# Patient Record
Sex: FEMALE | Race: WHITE | NOT HISPANIC OR LATINO | Employment: OTHER | URBAN - NONMETROPOLITAN AREA
[De-identification: names, ages, dates, MRNs, and addresses within clinical notes are randomized per-mention and may not be internally consistent; named-entity substitution may affect disease eponyms.]

---

## 2017-01-16 ENCOUNTER — CLINICAL SUPPORT (OUTPATIENT)
Dept: OBSTETRICS AND GYNECOLOGY | Facility: CLINIC | Age: 47
End: 2017-01-16

## 2017-01-16 VITALS
HEIGHT: 65 IN | BODY MASS INDEX: 24.49 KG/M2 | WEIGHT: 147 LBS | SYSTOLIC BLOOD PRESSURE: 126 MMHG | DIASTOLIC BLOOD PRESSURE: 70 MMHG

## 2017-01-16 DIAGNOSIS — Z79.890 POSTMENOPAUSAL HRT (HORMONE REPLACEMENT THERAPY): Primary | ICD-10-CM

## 2017-01-16 PROCEDURE — 96372 THER/PROPH/DIAG INJ SC/IM: CPT | Performed by: OBSTETRICS & GYNECOLOGY

## 2017-01-16 RX ORDER — GUANFACINE 1 MG/1
1 TABLET ORAL NIGHTLY
COMMUNITY
End: 2017-02-01

## 2017-01-16 RX ORDER — UREA 10 %
5 LOTION (ML) TOPICAL NIGHTLY
COMMUNITY

## 2017-01-16 RX ORDER — TINIDAZOLE 500 MG/1
TABLET ORAL
COMMUNITY
End: 2017-02-01

## 2017-01-16 RX ORDER — VENLAFAXINE HYDROCHLORIDE 150 MG/1
150 CAPSULE, EXTENDED RELEASE ORAL DAILY
COMMUNITY
End: 2017-05-10 | Stop reason: SDUPTHER

## 2017-01-16 RX ORDER — TIZANIDINE 4 MG/1
4 TABLET ORAL NIGHTLY PRN
COMMUNITY
End: 2017-05-10

## 2017-01-16 RX ORDER — TOPIRAMATE 25 MG/1
25 CAPSULE, COATED PELLETS ORAL 2 TIMES DAILY
COMMUNITY
End: 2017-06-12

## 2017-01-16 RX ORDER — DEXTROAMPHETAMINE SACCHARATE, AMPHETAMINE ASPARTATE, DEXTROAMPHETAMINE SULFATE AND AMPHETAMINE SULFATE 2.5; 2.5; 2.5; 2.5 MG/1; MG/1; MG/1; MG/1
10 TABLET ORAL DAILY
COMMUNITY
End: 2017-02-01

## 2017-01-16 RX ORDER — OXYCODONE AND ACETAMINOPHEN 10; 325 MG/1; MG/1
1 TABLET ORAL EVERY 6 HOURS PRN
COMMUNITY
End: 2017-08-21 | Stop reason: SDUPTHER

## 2017-01-16 RX ORDER — OMEPRAZOLE 20 MG/1
20 CAPSULE, DELAYED RELEASE ORAL DAILY
COMMUNITY
End: 2017-05-10 | Stop reason: SDUPTHER

## 2017-01-16 RX ORDER — FENTANYL 50 UG/H
1 PATCH TRANSDERMAL
COMMUNITY
End: 2017-10-31 | Stop reason: SDUPTHER

## 2017-02-01 ENCOUNTER — OFFICE VISIT (OUTPATIENT)
Dept: INTERNAL MEDICINE | Facility: CLINIC | Age: 47
End: 2017-02-01

## 2017-02-01 VITALS
OXYGEN SATURATION: 95 % | BODY MASS INDEX: 24.68 KG/M2 | HEIGHT: 65 IN | TEMPERATURE: 99 F | SYSTOLIC BLOOD PRESSURE: 122 MMHG | HEART RATE: 72 BPM | DIASTOLIC BLOOD PRESSURE: 80 MMHG | WEIGHT: 148.13 LBS

## 2017-02-01 DIAGNOSIS — K21.9 GASTROESOPHAGEAL REFLUX DISEASE WITHOUT ESOPHAGITIS: ICD-10-CM

## 2017-02-01 DIAGNOSIS — F32.A ANXIETY AND DEPRESSION: Primary | ICD-10-CM

## 2017-02-01 DIAGNOSIS — M54.50 CHRONIC BILATERAL LOW BACK PAIN WITHOUT SCIATICA: ICD-10-CM

## 2017-02-01 DIAGNOSIS — G89.29 CHRONIC BILATERAL LOW BACK PAIN WITHOUT SCIATICA: ICD-10-CM

## 2017-02-01 DIAGNOSIS — F41.9 ANXIETY AND DEPRESSION: Primary | ICD-10-CM

## 2017-02-01 DIAGNOSIS — F90.2 ATTENTION DEFICIT HYPERACTIVITY DISORDER (ADHD), COMBINED TYPE: ICD-10-CM

## 2017-02-01 LAB
ALBUMIN SERPL-MCNC: 4.6 G/DL (ref 3.2–4.8)
ALBUMIN/GLOB SERPL: 1.7 G/DL (ref 1.5–2.5)
ALP SERPL-CCNC: 78 U/L (ref 25–100)
ALT SERPL W P-5'-P-CCNC: 29 U/L (ref 7–40)
ANION GAP SERPL CALCULATED.3IONS-SCNC: 8 MMOL/L (ref 3–11)
AST SERPL-CCNC: 34 U/L (ref 0–33)
BASOPHILS # BLD AUTO: 0.01 10*3/MM3 (ref 0–0.2)
BASOPHILS NFR BLD AUTO: 0.3 % (ref 0–1)
BILIRUB SERPL-MCNC: 0.4 MG/DL (ref 0.3–1.2)
BUN BLD-MCNC: 20 MG/DL (ref 9–23)
BUN/CREAT SERPL: 33.3 (ref 7–25)
CALCIUM SPEC-SCNC: 9.8 MG/DL (ref 8.7–10.4)
CHLORIDE SERPL-SCNC: 100 MMOL/L (ref 99–109)
CO2 SERPL-SCNC: 29 MMOL/L (ref 20–31)
CREAT BLD-MCNC: 0.6 MG/DL (ref 0.6–1.3)
DEPRECATED RDW RBC AUTO: 40.3 FL (ref 37–54)
EOSINOPHIL # BLD AUTO: 0.06 10*3/MM3 (ref 0.1–0.3)
EOSINOPHIL NFR BLD AUTO: 1.7 % (ref 0–3)
ERYTHROCYTE [DISTWIDTH] IN BLOOD BY AUTOMATED COUNT: 12.9 % (ref 11.3–14.5)
GFR SERPL CREATININE-BSD FRML MDRD: 107 ML/MIN/1.73
GLOBULIN UR ELPH-MCNC: 2.7 GM/DL
GLUCOSE BLD-MCNC: 80 MG/DL (ref 70–100)
HCT VFR BLD AUTO: 37.5 % (ref 34.5–44)
HGB BLD-MCNC: 12.7 G/DL (ref 11.5–15.5)
IMM GRANULOCYTES # BLD: 0 10*3/MM3 (ref 0–0.03)
IMM GRANULOCYTES NFR BLD: 0 % (ref 0–0.6)
LYMPHOCYTES # BLD AUTO: 1.64 10*3/MM3 (ref 0.6–4.8)
LYMPHOCYTES NFR BLD AUTO: 46.1 % (ref 24–44)
MCH RBC QN AUTO: 29.1 PG (ref 27–31)
MCHC RBC AUTO-ENTMCNC: 33.9 G/DL (ref 32–36)
MCV RBC AUTO: 85.8 FL (ref 80–99)
MONOCYTES # BLD AUTO: 0.39 10*3/MM3 (ref 0–1)
MONOCYTES NFR BLD AUTO: 11 % (ref 0–12)
NEUTROPHILS # BLD AUTO: 1.46 10*3/MM3 (ref 1.5–8.3)
NEUTROPHILS NFR BLD AUTO: 40.9 % (ref 41–71)
PLATELET # BLD AUTO: 193 10*3/MM3 (ref 150–450)
PMV BLD AUTO: 10.2 FL (ref 6–12)
POTASSIUM BLD-SCNC: 3.9 MMOL/L (ref 3.5–5.5)
PROT SERPL-MCNC: 7.3 G/DL (ref 5.7–8.2)
RBC # BLD AUTO: 4.37 10*6/MM3 (ref 3.89–5.14)
SODIUM BLD-SCNC: 137 MMOL/L (ref 132–146)
WBC NRBC COR # BLD: 3.56 10*3/MM3 (ref 3.5–10.8)

## 2017-02-01 PROCEDURE — 36415 COLL VENOUS BLD VENIPUNCTURE: CPT | Performed by: INTERNAL MEDICINE

## 2017-02-01 PROCEDURE — 99203 OFFICE O/P NEW LOW 30 MIN: CPT | Performed by: INTERNAL MEDICINE

## 2017-02-01 PROCEDURE — 85025 COMPLETE CBC W/AUTO DIFF WBC: CPT | Performed by: INTERNAL MEDICINE

## 2017-02-01 PROCEDURE — 80053 COMPREHEN METABOLIC PANEL: CPT | Performed by: INTERNAL MEDICINE

## 2017-02-01 RX ORDER — POTASSIUM CHLORIDE 750 MG/1
10 TABLET, FILM COATED, EXTENDED RELEASE ORAL 2 TIMES DAILY
COMMUNITY
End: 2017-05-10 | Stop reason: SDUPTHER

## 2017-02-01 RX ORDER — GABAPENTIN 800 MG/1
800 TABLET ORAL 3 TIMES DAILY
COMMUNITY
End: 2017-05-10

## 2017-02-01 RX ORDER — LAMOTRIGINE 100 MG/1
100 TABLET ORAL DAILY
COMMUNITY
End: 2017-05-10 | Stop reason: SDUPTHER

## 2017-02-01 RX ORDER — GUANFACINE 2 MG/1
TABLET, EXTENDED RELEASE ORAL
Refills: 4 | COMMUNITY
Start: 2017-01-03 | End: 2017-05-10

## 2017-02-01 RX ORDER — DEXTROAMPHETAMINE SACCHARATE, AMPHETAMINE ASPARTATE MONOHYDRATE, DEXTROAMPHETAMINE SULFATE AND AMPHETAMINE SULFATE 6.25; 6.25; 6.25; 6.25 MG/1; MG/1; MG/1; MG/1
25 CAPSULE, EXTENDED RELEASE ORAL EVERY MORNING
Qty: 30 CAPSULE | Refills: 0 | Status: SHIPPED | OUTPATIENT
Start: 2017-02-01 | End: 2017-03-10 | Stop reason: SDUPTHER

## 2017-02-01 NOTE — PROGRESS NOTES
Chief Complaint   Patient presents with   • Establish Care     with Dr. Vermeesch.      Subjective   Jazmyn Sinclair is a 46 y.o. female.     HPI Comments: Patient is here to be established.  Past medical history of anxiety and depression, gastroesophageal reflux disease, migraine headaches, anemia, chronic back pain status post multiple surgeries, ADHD.  Past surgical history of appendectomy, tubal ligation, cryoablation, carpal tunnel release, hysterectomy and multiple back surgeries.  Social history, family history, medications and allergies have been reviewed.  HCM:  Mammo 2015, pap Nov 2016.  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  Last surgery was 2008 for spinal cord implant stimulator.   She does not have any significant falls.   She sees Dr Moreland for her pain medications.  She has had an EGD in 2001 that showed HH and GERD, omeprazole works fairly well.   She had colonoscopy in early 2000s, it was normal.   Her father had colon cancer.  No black stools or blood in stool.  She has chronic constipation.   She states she gets a lot of fruits and vegetables and drinks a lot of water.   Her HAs are well controlled.  She states she has had ADHD for many yrs.  She went for formal testing in [place] for this diagnosis.   Pt states she has to write things down on boards in rooms to keep herself on task.  She was started on intuniv in the past one yr by last PCP and states it is helpful.  She is taking adderall 25 mg every AM.   Her depression has worsened over the last several months.  Her sister committed suicide and she is grieving at this time.  She is having difficulty accepting this and does agree to counseling.  Has not had any blood work done for a long time.                  The following portions of the patient's history were reviewed and updated as appropriate: allergies, current medications, past family history, past  "medical history, past social history, past surgical history and problem list.    Review of Systems   Respiratory: Negative for shortness of breath.    Cardiovascular: Negative for chest pain and palpitations.   Gastrointestinal: Positive for constipation.   Musculoskeletal: Positive for back pain and myalgias.        Muscle spasms in neck   Neurological: Negative for headaches.   Psychiatric/Behavioral: Positive for decreased concentration and dysphoric mood. Negative for suicidal ideas. The patient is nervous/anxious.    All other systems reviewed and are negative.      Objective   Visit Vitals   • /80   • Pulse 72   • Temp 99 °F (37.2 °C)   • Ht 65\" (165.1 cm)   • Wt 148 lb 2 oz (67.2 kg)   • LMP  (LMP Unknown)   • SpO2 95%   • BMI 24.65 kg/m2     Body mass index is 24.65 kg/(m^2).  Physical Exam   Constitutional: She is oriented to person, place, and time. She appears well-developed and well-nourished.   HENT:   Head: Normocephalic and atraumatic.   Right Ear: External ear normal.   Left Ear: External ear normal.   Mouth/Throat: Oropharynx is clear and moist.   Eyes: Conjunctivae and EOM are normal. Pupils are equal, round, and reactive to light.   Neck: Normal range of motion. Neck supple. No thyromegaly present.   Cardiovascular: Normal rate, regular rhythm, normal heart sounds and intact distal pulses.    No murmur heard.  Pulmonary/Chest: Effort normal and breath sounds normal. No respiratory distress.   Abdominal: Soft. Bowel sounds are normal.   Musculoskeletal: She exhibits no edema.   Lymphadenopathy:     She has no cervical adenopathy.   Neurological: She is alert and oriented to person, place, and time. No cranial nerve deficit.   Psychiatric: Judgment normal.   Flat affect and sad mood   Nursing note and vitals reviewed.      Assessment/Plan   Jazmyn Sinclair is here today and the following problems have been addressed:      Jazmyn was seen today for establish care.    Diagnoses and all orders for " this visit:    Anxiety and depression  -     CBC & Differential; Future  -     Comprehensive Metabolic Panel; Future  -     Ambulatory Referral to Psychology    Attention deficit hyperactivity disorder (ADHD), combined type    Gastroesophageal reflux disease without esophagitis  -     CBC & Differential; Future  -     Comprehensive Metabolic Panel; Future    Chronic bilateral low back pain without sciatica  -     CBC & Differential; Future  -     Comprehensive Metabolic Panel; Future    Other orders  -     amphetamine-dextroamphetamine XR (ADDERALL XR) 25 MG 24 hr capsule; Take 1 capsule by mouth Every Morning.      Labs as noted  We discussed grief counseling and counseling for attention deficit disorder, referred to counselor  No med changes for now  Continue to see pain management  Patient signed a controlled medication contract, Adderall refill was provided.  She is agreeable to annual drug testing    RTC 3 mo

## 2017-02-06 ENCOUNTER — RESULTS ENCOUNTER (OUTPATIENT)
Dept: OBSTETRICS AND GYNECOLOGY | Facility: CLINIC | Age: 47
End: 2017-02-06

## 2017-02-06 ENCOUNTER — OFFICE VISIT (OUTPATIENT)
Dept: OBSTETRICS AND GYNECOLOGY | Facility: CLINIC | Age: 47
End: 2017-02-06

## 2017-02-06 VITALS
HEIGHT: 65 IN | WEIGHT: 147 LBS | DIASTOLIC BLOOD PRESSURE: 78 MMHG | SYSTOLIC BLOOD PRESSURE: 124 MMHG | BODY MASS INDEX: 24.49 KG/M2

## 2017-02-06 DIAGNOSIS — Z11.3 SCREENING FOR STD (SEXUALLY TRANSMITTED DISEASE): ICD-10-CM

## 2017-02-06 DIAGNOSIS — A59.01 TRICHOMONAL VULVOVAGINITIS: ICD-10-CM

## 2017-02-06 DIAGNOSIS — Z11.3 SCREENING FOR STD (SEXUALLY TRANSMITTED DISEASE): Primary | ICD-10-CM

## 2017-02-06 PROCEDURE — 99213 OFFICE O/P EST LOW 20 MIN: CPT | Performed by: PHYSICIAN ASSISTANT

## 2017-02-06 RX ORDER — TOPIRAMATE 25 MG/1
TABLET ORAL
Refills: 1 | COMMUNITY
Start: 2017-02-01 | End: 2017-05-10

## 2017-02-06 RX ORDER — GABAPENTIN 600 MG/1
600 TABLET ORAL 3 TIMES DAILY
Refills: 1 | COMMUNITY
Start: 2017-02-01 | End: 2017-08-21 | Stop reason: SDUPTHER

## 2017-02-06 NOTE — PROGRESS NOTES
"Subjective   Chief Complaint   Patient presents with   • Follow-up     STD recheck     Visit Vitals   • /78   • Ht 64.5\" (163.8 cm)   • Wt 147 lb (66.7 kg)   • LMP  (LMP Unknown)   • BMI 24.84 kg/m2      Jazmyn Sinclair is a 46 y.o. year old  presenting to be seen for STD screening  She was seen for annual 2016 and noted to have trichomonas on pap smear  She has completed treatment with Flagyl and desires to be rescreened to make sure trichomonas resolved    Past Medical History   Diagnosis Date   • Acid reflux    • Anemia    • Anxiety    • Arthritis    • Depression    • Fractures    • GERD (gastroesophageal reflux disease)    • Kidney infection    • Migraine    • Ovarian cyst    • Pap smear for cervical cancer screening      2016        Current Outpatient Prescriptions:   •  amphetamine-dextroamphetamine XR (ADDERALL XR) 25 MG 24 hr capsule, Take 1 capsule by mouth Every Morning., Disp: 30 capsule, Rfl: 0  •  estradiol cypionate (DEPO-ESTRADIOL) 5 MG/ML injection, Inject  into the shoulder, thigh, or buttocks Every 28 (Twenty-Eight) Days., Disp: , Rfl:   •  fentaNYL (DURAGESIC) 50 MCG/HR patch, Place 1 patch on the skin Every 72 (Seventy-Two) Hours., Disp: , Rfl:   •  gabapentin (NEURONTIN) 600 MG tablet, , Disp: , Rfl: 1  •  GuanFACINE HCl ER 2 MG tablet sustained-release 24 hour, , Disp: , Rfl: 4  •  lamoTRIgine (LaMICtal) 100 MG tablet, Take 100 mg by mouth Daily., Disp: , Rfl:   •  melatonin 1 MG tablet, Take  by mouth., Disp: , Rfl:   •  omeprazole (priLOSEC) 20 MG capsule, Take 20 mg by mouth Daily., Disp: , Rfl:   •  oxyCODONE-acetaminophen (PERCOCET)  MG per tablet, Take 1 tablet by mouth Every 6 (Six) Hours As Needed for moderate pain (4-6)., Disp: , Rfl:   •  potassium chloride (K-DUR,KLOR-CON) 10 MEQ ER tablet, Take 10 mEq by mouth 2 (Two) Times a Day., Disp: , Rfl:   •  tiZANidine (ZANAFLEX) 4 MG tablet, Take 4 mg by mouth At Night As Needed for muscle spasms., Disp: , Rfl: "   •  topiramate (TOPAMAX) 25 MG capsule, Take 25 mg by mouth 2 (Two) Times a Day., Disp: , Rfl:   •  venlafaxine XR (EFFEXOR-XR) 150 MG 24 hr capsule, Take 150 mg by mouth Daily., Disp: , Rfl:   •  fentaNYL (DURAGESIC) 50 MCG/HR patch, Apply 1 patch on the skin once every 48 hours, Disp: 15 patch, Rfl: 0  •  gabapentin (NEURONTIN) 800 MG tablet, Take 800 mg by mouth 3 (Three) Times a Day., Disp: , Rfl:   •  topiramate (TOPAMAX) 25 MG tablet, , Disp: , Rfl: 1   Allergies   Allergen Reactions   • Chantix [Varenicline]    • Flagyl [Metronidazole]    • Keflex [Cephalexin]    • Morphine And Related    • Penicillins       Past Surgical History   Procedure Laterality Date   • Appendectomy     • Tubal abdominal ligation     • Back surgery       1999,2006,2006,2006,2006,2008   • Carpal tunnel release     • Cryoablation     • Hysterectomy        Social History     Social History   • Marital status:      Spouse name: N/A   • Number of children: N/A   • Years of education: N/A     Occupational History   • Not on file.     Social History Main Topics   • Smoking status: Former Smoker   • Smokeless tobacco: Never Used   • Alcohol use No   • Drug use: No   • Sexual activity: Defer     Other Topics Concern   • Not on file     Social History Narrative      Family History   Problem Relation Age of Onset   • Skin cancer Mother    • Hypertension Mother    • Obesity Mother    • Cancer Father        The following portions of the patient's history were reviewed and updated as appropriate:problem list, current medications, allergies, past family history, past medical history, past social history and past surgical history.    Review of Systems   Constitutional: Negative.    Gastrointestinal: Negative.    Endocrine: Positive for heat intolerance.   Genitourinary: Negative for pelvic pain, vaginal discharge and vaginal pain.        Objective     Physical Exam   Constitutional: She appears well-developed and well-nourished.   Abdominal:  Soft. Normal appearance. She exhibits no distension and no mass. There is no tenderness.   Genitourinary: There is no rash or tenderness on the right labia. There is no rash or tenderness on the left labia. Right adnexum displays no mass and no tenderness. Left adnexum displays no mass and no tenderness. No erythema, tenderness or bleeding in the vagina. No vaginal discharge found.   Skin: Skin is warm and dry.   Psychiatric: She has a normal mood and affect. Her behavior is normal.     Jazmyn was seen today for follow-up.    Diagnoses and all orders for this visit:    Screening for STD (sexually transmitted disease)  -     Trichomonas Vaginalis DNA Probe; Future    Trichomonal vulvovaginitis   -     Trichomonas Vaginalis DNA Probe; Future           This note was electronically signed.    Shanti Haines PA-C   February 6, 2017

## 2017-02-12 ENCOUNTER — TELEPHONE (OUTPATIENT)
Dept: OBSTETRICS AND GYNECOLOGY | Facility: CLINIC | Age: 47
End: 2017-02-12

## 2017-02-13 ENCOUNTER — CLINICAL SUPPORT (OUTPATIENT)
Dept: OBSTETRICS AND GYNECOLOGY | Facility: CLINIC | Age: 47
End: 2017-02-13

## 2017-02-13 VITALS
WEIGHT: 148 LBS | HEIGHT: 65 IN | DIASTOLIC BLOOD PRESSURE: 66 MMHG | SYSTOLIC BLOOD PRESSURE: 128 MMHG | BODY MASS INDEX: 24.66 KG/M2

## 2017-02-13 DIAGNOSIS — Z79.890 POSTMENOPAUSAL HRT (HORMONE REPLACEMENT THERAPY): Primary | ICD-10-CM

## 2017-02-13 PROCEDURE — 96372 THER/PROPH/DIAG INJ SC/IM: CPT | Performed by: OBSTETRICS & GYNECOLOGY

## 2017-02-13 RX ORDER — METRONIDAZOLE 500 MG/1
500 TABLET ORAL 2 TIMES DAILY
Qty: 14 TABLET | Refills: 0 | Status: SHIPPED | OUTPATIENT
Start: 2017-02-13 | End: 2017-02-20

## 2017-03-10 RX ORDER — DEXTROAMPHETAMINE SACCHARATE, AMPHETAMINE ASPARTATE MONOHYDRATE, DEXTROAMPHETAMINE SULFATE AND AMPHETAMINE SULFATE 6.25; 6.25; 6.25; 6.25 MG/1; MG/1; MG/1; MG/1
25 CAPSULE, EXTENDED RELEASE ORAL EVERY MORNING
Qty: 30 CAPSULE | Refills: 0 | Status: SHIPPED | OUTPATIENT
Start: 2017-03-10 | End: 2017-04-10 | Stop reason: SDUPTHER

## 2017-03-14 ENCOUNTER — CLINICAL SUPPORT (OUTPATIENT)
Dept: OBSTETRICS AND GYNECOLOGY | Facility: CLINIC | Age: 47
End: 2017-03-14

## 2017-03-14 VITALS
HEIGHT: 62 IN | WEIGHT: 148 LBS | BODY MASS INDEX: 27.23 KG/M2 | SYSTOLIC BLOOD PRESSURE: 128 MMHG | DIASTOLIC BLOOD PRESSURE: 66 MMHG

## 2017-03-14 DIAGNOSIS — Z79.890 POSTMENOPAUSAL HRT (HORMONE REPLACEMENT THERAPY): Primary | ICD-10-CM

## 2017-03-14 PROCEDURE — 96372 THER/PROPH/DIAG INJ SC/IM: CPT | Performed by: OBSTETRICS & GYNECOLOGY

## 2017-03-20 ENCOUNTER — RESULTS ENCOUNTER (OUTPATIENT)
Dept: OBSTETRICS AND GYNECOLOGY | Facility: CLINIC | Age: 47
End: 2017-03-20

## 2017-03-20 ENCOUNTER — OFFICE VISIT (OUTPATIENT)
Dept: OBSTETRICS AND GYNECOLOGY | Facility: CLINIC | Age: 47
End: 2017-03-20

## 2017-03-20 VITALS
BODY MASS INDEX: 23.99 KG/M2 | DIASTOLIC BLOOD PRESSURE: 78 MMHG | WEIGHT: 144 LBS | SYSTOLIC BLOOD PRESSURE: 130 MMHG | HEIGHT: 65 IN

## 2017-03-20 DIAGNOSIS — Z11.3 SCREENING FOR STD (SEXUALLY TRANSMITTED DISEASE): Primary | ICD-10-CM

## 2017-03-20 DIAGNOSIS — Z11.3 SCREENING FOR STD (SEXUALLY TRANSMITTED DISEASE): ICD-10-CM

## 2017-03-20 PROCEDURE — 99213 OFFICE O/P EST LOW 20 MIN: CPT | Performed by: PHYSICIAN ASSISTANT

## 2017-03-20 PROCEDURE — 87210 SMEAR WET MOUNT SALINE/INK: CPT | Performed by: PHYSICIAN ASSISTANT

## 2017-03-20 RX ORDER — MELOXICAM 15 MG/1
TABLET ORAL
Refills: 2 | COMMUNITY
Start: 2017-03-16 | End: 2017-06-27 | Stop reason: SDUPTHER

## 2017-03-20 NOTE — PROGRESS NOTES
"Subjective   Chief Complaint   Patient presents with   • Follow-up     culture results     Visit Vitals   • /78   • Ht 65\" (165.1 cm)   • Wt 144 lb (65.3 kg)   • LMP  (LMP Unknown)   • BMI 23.96 kg/m2      Jazmyn Sinclair is a 46 y.o. year old  presenting to be seen for TATIANA for trichomonas    Has been treated x 2--trichomonas initially found on pap--was treated with tinidazole--she had a follow up vaginal culture and was still positive for trichomonas then treated with flagyl  She has no complaints of vaginal symptoms today    Past Medical History   Diagnosis Date   • Acid reflux    • Anemia    • Anxiety    • Arthritis    • Depression    • Fractures    • GERD (gastroesophageal reflux disease)    • Kidney infection    • Migraine    • Ovarian cyst    • Pap smear for cervical cancer screening      2016        Current Outpatient Prescriptions:   •  amphetamine-dextroamphetamine XR (ADDERALL XR) 25 MG 24 hr capsule, Take 1 capsule by mouth Every Morning., Disp: 30 capsule, Rfl: 0  •  estradiol cypionate (DEPO-ESTRADIOL) 5 MG/ML injection, Inject  into the shoulder, thigh, or buttocks Every 28 (Twenty-Eight) Days., Disp: , Rfl:   •  fentaNYL (DURAGESIC) 50 MCG/HR patch, Place 1 patch on the skin Every 72 (Seventy-Two) Hours., Disp: , Rfl:   •  gabapentin (NEURONTIN) 600 MG tablet, , Disp: , Rfl: 1  •  GuanFACINE HCl ER 2 MG tablet sustained-release 24 hour, , Disp: , Rfl: 4  •  lamoTRIgine (LaMICtal) 100 MG tablet, Take 100 mg by mouth Daily., Disp: , Rfl:   •  melatonin 1 MG tablet, Take  by mouth., Disp: , Rfl:   •  meloxicam (MOBIC) 15 MG tablet, , Disp: , Rfl: 2  •  omeprazole (priLOSEC) 20 MG capsule, Take 20 mg by mouth Daily., Disp: , Rfl:   •  oxyCODONE-acetaminophen (PERCOCET)  MG per tablet, Take 1 tablet by mouth Every 6 (Six) Hours As Needed for moderate pain (4-6)., Disp: , Rfl:   •  potassium chloride (K-DUR,KLOR-CON) 10 MEQ ER tablet, Take 10 mEq by mouth 2 (Two) Times a Day., Disp: , " Rfl:   •  tiZANidine (ZANAFLEX) 4 MG tablet, Take 4 mg by mouth At Night As Needed for muscle spasms., Disp: , Rfl:   •  topiramate (TOPAMAX) 25 MG capsule, Take 25 mg by mouth 2 (Two) Times a Day., Disp: , Rfl:   •  venlafaxine XR (EFFEXOR-XR) 150 MG 24 hr capsule, Take 150 mg by mouth Daily., Disp: , Rfl:   •  estradiol cypionate (DEPO-ESTRADIOL) 5 MG/ML injection, Inject 1.5ml into the shoulder, thigh, or buttocks every 4 weeks, Disp: 5 mL, Rfl: 4  •  fentaNYL (DURAGESIC) 50 MCG/HR patch, Apply 1 patch on the skin once every 48 hours, Disp: 15 patch, Rfl: 0  •  gabapentin (NEURONTIN) 800 MG tablet, Take 800 mg by mouth 3 (Three) Times a Day., Disp: , Rfl:   •  oxyCODONE-acetaminophen (PERCOCET)  MG per tablet, Take 1 tablet by mouth Every 6 (Six) Hours., Disp: 120 tablet, Rfl: 0  •  topiramate (TOPAMAX) 25 MG tablet, , Disp: , Rfl: 1   Allergies   Allergen Reactions   • Chantix [Varenicline]    • Flagyl [Metronidazole]    • Keflex [Cephalexin]    • Morphine And Related    • Penicillins       Past Surgical History   Procedure Laterality Date   • Appendectomy     • Tubal abdominal ligation     • Back surgery       1999,2006,2006,2006,2006,2008   • Carpal tunnel release     • Cryoablation     • Hysterectomy        Social History     Social History   • Marital status:      Spouse name: N/A   • Number of children: N/A   • Years of education: N/A     Occupational History   • Not on file.     Social History Main Topics   • Smoking status: Former Smoker   • Smokeless tobacco: Never Used   • Alcohol use No   • Drug use: No   • Sexual activity: Defer     Other Topics Concern   • Not on file     Social History Narrative      Family History   Problem Relation Age of Onset   • Skin cancer Mother    • Hypertension Mother    • Obesity Mother    • Cancer Father        The following portions of the patient's history were reviewed and updated as appropriate:problem list, current medications, allergies, past family  history, past medical history, past social history and past surgical history.  Objective     Physical Exam   Constitutional: She appears well-developed and well-nourished.   Abdominal: Soft. Normal appearance. She exhibits no distension and no mass. There is no tenderness.   Genitourinary: Vagina normal. There is no tenderness or lesion on the right labia. There is no tenderness or lesion on the left labia. Right adnexum displays no mass and no tenderness. Left adnexum displays no mass and no tenderness.   Skin: Skin is warm and dry.   Psychiatric: She has a normal mood and affect. Her behavior is normal.     Jazmyn was seen today for follow-up.    Diagnoses and all orders for this visit:    Screening for STD (sexually transmitted disease)  -     Trichomonas Vaginalis DNA Probe; Future             This note was electronically signed.    Shanti Haines PA-C   March 20, 2017

## 2017-03-27 ENCOUNTER — TELEPHONE (OUTPATIENT)
Dept: OBSTETRICS AND GYNECOLOGY | Facility: CLINIC | Age: 47
End: 2017-03-27

## 2017-04-11 RX ORDER — DEXTROAMPHETAMINE SACCHARATE, AMPHETAMINE ASPARTATE MONOHYDRATE, DEXTROAMPHETAMINE SULFATE AND AMPHETAMINE SULFATE 6.25; 6.25; 6.25; 6.25 MG/1; MG/1; MG/1; MG/1
25 CAPSULE, EXTENDED RELEASE ORAL EVERY MORNING
Qty: 30 CAPSULE | Refills: 0 | Status: SHIPPED | OUTPATIENT
Start: 2017-04-11 | End: 2017-05-10 | Stop reason: SDUPTHER

## 2017-04-17 ENCOUNTER — CLINICAL SUPPORT (OUTPATIENT)
Dept: OBSTETRICS AND GYNECOLOGY | Facility: CLINIC | Age: 47
End: 2017-04-17

## 2017-04-17 VITALS
HEIGHT: 62 IN | DIASTOLIC BLOOD PRESSURE: 68 MMHG | SYSTOLIC BLOOD PRESSURE: 124 MMHG | BODY MASS INDEX: 27.23 KG/M2 | WEIGHT: 148 LBS

## 2017-04-17 DIAGNOSIS — Z79.890 HORMONE REPLACEMENT THERAPY (HRT): Primary | ICD-10-CM

## 2017-04-17 PROCEDURE — 96372 THER/PROPH/DIAG INJ SC/IM: CPT | Performed by: OBSTETRICS & GYNECOLOGY

## 2017-04-18 ENCOUNTER — TRANSCRIBE ORDERS (OUTPATIENT)
Dept: ADMINISTRATIVE | Facility: HOSPITAL | Age: 47
End: 2017-04-18

## 2017-04-18 DIAGNOSIS — Z13.9 SCREENING: Primary | ICD-10-CM

## 2017-05-10 ENCOUNTER — OFFICE VISIT (OUTPATIENT)
Dept: INTERNAL MEDICINE | Facility: CLINIC | Age: 47
End: 2017-05-10

## 2017-05-10 VITALS
OXYGEN SATURATION: 96 % | HEART RATE: 105 BPM | HEIGHT: 62 IN | DIASTOLIC BLOOD PRESSURE: 60 MMHG | SYSTOLIC BLOOD PRESSURE: 110 MMHG | BODY MASS INDEX: 27.97 KG/M2 | WEIGHT: 152 LBS

## 2017-05-10 DIAGNOSIS — F32.A ANXIETY AND DEPRESSION: ICD-10-CM

## 2017-05-10 DIAGNOSIS — K21.9 GASTROESOPHAGEAL REFLUX DISEASE WITHOUT ESOPHAGITIS: Primary | ICD-10-CM

## 2017-05-10 DIAGNOSIS — F41.9 ANXIETY AND DEPRESSION: ICD-10-CM

## 2017-05-10 DIAGNOSIS — F90.2 ATTENTION DEFICIT HYPERACTIVITY DISORDER (ADHD), COMBINED TYPE: ICD-10-CM

## 2017-05-10 PROCEDURE — 99213 OFFICE O/P EST LOW 20 MIN: CPT | Performed by: INTERNAL MEDICINE

## 2017-05-10 RX ORDER — POTASSIUM CHLORIDE 750 MG/1
10 CAPSULE, EXTENDED RELEASE ORAL 2 TIMES DAILY
Qty: 60 CAPSULE | Refills: 11 | Status: SHIPPED | OUTPATIENT
Start: 2017-05-10 | End: 2018-05-17 | Stop reason: SDUPTHER

## 2017-05-10 RX ORDER — GUANFACINE 2 MG/1
2 TABLET ORAL NIGHTLY
Qty: 30 TABLET | Refills: 5 | Status: SHIPPED | OUTPATIENT
Start: 2017-05-10 | End: 2017-11-22

## 2017-05-10 RX ORDER — LAMOTRIGINE 100 MG/1
100 TABLET ORAL DAILY
Qty: 30 TABLET | Refills: 5 | Status: SHIPPED | OUTPATIENT
Start: 2017-05-10 | End: 2017-09-27 | Stop reason: SDUPTHER

## 2017-05-10 RX ORDER — VENLAFAXINE HYDROCHLORIDE 150 MG/1
150 CAPSULE, EXTENDED RELEASE ORAL DAILY
Qty: 30 CAPSULE | Refills: 11 | Status: SHIPPED | OUTPATIENT
Start: 2017-05-10 | End: 2018-05-17 | Stop reason: SDUPTHER

## 2017-05-10 RX ORDER — DEXTROAMPHETAMINE SACCHARATE, AMPHETAMINE ASPARTATE MONOHYDRATE, DEXTROAMPHETAMINE SULFATE AND AMPHETAMINE SULFATE 6.25; 6.25; 6.25; 6.25 MG/1; MG/1; MG/1; MG/1
25 CAPSULE, EXTENDED RELEASE ORAL EVERY MORNING
Qty: 30 CAPSULE | Refills: 0 | Status: SHIPPED | OUTPATIENT
Start: 2017-05-10 | End: 2017-06-12 | Stop reason: SDUPTHER

## 2017-05-10 RX ORDER — GUANFACINE 2 MG/1
TABLET, EXTENDED RELEASE ORAL
Qty: 30 TABLET | Refills: 4 | Status: CANCELLED | OUTPATIENT
Start: 2017-05-10

## 2017-05-10 RX ORDER — RANITIDINE 150 MG/1
150 TABLET ORAL 2 TIMES DAILY
Qty: 60 TABLET | Refills: 11 | Status: SHIPPED | OUTPATIENT
Start: 2017-05-10 | End: 2018-05-10

## 2017-05-10 RX ORDER — OMEPRAZOLE 20 MG/1
20 CAPSULE, DELAYED RELEASE ORAL DAILY
Qty: 30 CAPSULE | Refills: 1 | Status: SHIPPED | OUTPATIENT
Start: 2017-05-10 | End: 2017-06-13 | Stop reason: SDUPTHER

## 2017-05-15 ENCOUNTER — CLINICAL SUPPORT (OUTPATIENT)
Dept: OBSTETRICS AND GYNECOLOGY | Facility: CLINIC | Age: 47
End: 2017-05-15

## 2017-05-15 ENCOUNTER — APPOINTMENT (OUTPATIENT)
Dept: MAMMOGRAPHY | Facility: HOSPITAL | Age: 47
End: 2017-05-15
Attending: OBSTETRICS & GYNECOLOGY

## 2017-05-15 VITALS
WEIGHT: 147 LBS | SYSTOLIC BLOOD PRESSURE: 122 MMHG | HEIGHT: 62 IN | BODY MASS INDEX: 27.05 KG/M2 | DIASTOLIC BLOOD PRESSURE: 72 MMHG

## 2017-05-15 DIAGNOSIS — Z79.890 POSTMENOPAUSAL HRT (HORMONE REPLACEMENT THERAPY): Primary | ICD-10-CM

## 2017-05-15 PROCEDURE — 96372 THER/PROPH/DIAG INJ SC/IM: CPT | Performed by: OBSTETRICS & GYNECOLOGY

## 2017-06-12 ENCOUNTER — OFFICE VISIT (OUTPATIENT)
Dept: INTERNAL MEDICINE | Facility: CLINIC | Age: 47
End: 2017-06-12

## 2017-06-12 VITALS
TEMPERATURE: 98.4 F | WEIGHT: 146 LBS | HEART RATE: 82 BPM | SYSTOLIC BLOOD PRESSURE: 116 MMHG | DIASTOLIC BLOOD PRESSURE: 80 MMHG | OXYGEN SATURATION: 98 % | BODY MASS INDEX: 26.87 KG/M2 | HEIGHT: 62 IN

## 2017-06-12 DIAGNOSIS — H66.002 ACUTE SUPPURATIVE OTITIS MEDIA OF LEFT EAR WITHOUT SPONTANEOUS RUPTURE OF TYMPANIC MEMBRANE, RECURRENCE NOT SPECIFIED: Primary | ICD-10-CM

## 2017-06-12 DIAGNOSIS — J20.9 ACUTE BRONCHITIS, UNSPECIFIED ORGANISM: ICD-10-CM

## 2017-06-12 DIAGNOSIS — J01.40 ACUTE PANSINUSITIS, RECURRENCE NOT SPECIFIED: ICD-10-CM

## 2017-06-12 PROCEDURE — 99213 OFFICE O/P EST LOW 20 MIN: CPT | Performed by: PHYSICIAN ASSISTANT

## 2017-06-12 RX ORDER — CIPROFLOXACIN 500 MG/1
500 TABLET, FILM COATED ORAL 2 TIMES DAILY
Qty: 14 TABLET | Refills: 0 | Status: SHIPPED | OUTPATIENT
Start: 2017-06-12 | End: 2017-06-19

## 2017-06-12 RX ORDER — FLUTICASONE PROPIONATE 50 MCG
2 SPRAY, SUSPENSION (ML) NASAL DAILY
Qty: 16 ML | Refills: 0 | Status: SHIPPED | OUTPATIENT
Start: 2017-06-12 | End: 2017-07-12

## 2017-06-12 NOTE — PROGRESS NOTES
Jazmyn Sinclair is a 46 y.o. female.     Subjective   History of Present Illness   Here today with concern of 5 days of sore throat which progressed to nasal congestion, headaches, sinus pressure, dizziness, diarrhea, SOA and little cough. Cough is not productive but feels like it needs to be. Reports fatigue and fever up to 102 in the last 2 days. Has been using Sudafed which has not helped much.         The following portions of the patient's history were reviewed and updated as appropriate: allergies, current medications, past family history, past medical history, past social history, past surgical history and problem list.    Review of Systems    Constitutional: fatigue, fever. Negative for appetite change, chills and unexpected weight change.   HENT: postnasal drip, rhinorrhea, sore throat, congestion, ear pain.   Negative for hearing loss, nosebleeds, tinnitus and trouble swallowing.    Eyes: Negative for photophobia, discharge and visual disturbance.   Respiratory: cough, SOA. Negative for chest tightness and wheezing.    Cardiovascular: Negative for chest pain, palpitations and leg swelling.   Gastrointestinal: Diarrhea. Negative for abdominal distention, abdominal pain, blood in stool, constipation, nausea and vomiting.   Endocrine: Negative for cold intolerance, heat intolerance, polydipsia, polyphagia and polyuria.   Musculoskeletal: Negative for arthralgias, back pain, joint swelling, myalgias, neck pain and neck stiffness.   Skin: Negative for color change, pallor, rash and wound.   Allergic/Immunologic: Negative for environmental allergies, food allergies and immunocompromised state.   Neurological: headaches, dizziness. Negative for tremors, seizures, weakness, numbness.  Hematological: Negative for adenopathy. Does not bruise/bleed easily.   Psychiatric/Behavioral: Negative for sleep disturbances, agitation, behavioral problems, confusion, hallucinations, self-injury and suicidal ideas. The patient is  "not nervous/anxious.      Objective    Physical Exam  Constitutional: Oriented to person, place, and time. Appears well-developed and well-nourished.   HENT: OP erythema, left TM erythematous and bulging, right TM effusion without erythema or bulging.   Head: frontal and maxillary sinus tenderness. Normocephalic and atraumatic.   Eyes: EOM are normal. Pupils are equal, round, and reactive to light.   Neck: Normal range of motion. Neck supple.   Cardiovascular: Normal rate, regular rhythm and normal heart sounds.    Pulmonary/Chest: Effort normal and breath sounds normal. No respiratory distress.  Has no wheezes or rales. Exhibits no chest wall tenderness.   Abdominal: Soft. Bowel sounds are normal. Exhibits no distension and no mass. There is no tenderness.   Musculoskeletal: Normal range of motion. Exhibits no tenderness.   Neurological: Alert and oriented to person, place, and time.   Skin: Skin is warm and dry.   Psychiatric: Has a normal mood and affect. Behavior is normal. Judgment and thought content normal.       /80  Pulse 82  Temp 98.4 °F (36.9 °C)  Ht 62\" (157.5 cm)  Wt 146 lb (66.2 kg)  LMP  (LMP Unknown)  SpO2 98%  BMI 26.7 kg/m2    Nursing note and vitals reviewed.        Assessment/Plan   Jazmyn was seen today for cough, shortness of breath and sinus problem.    Diagnoses and all orders for this visit:    Acute suppurative otitis media of left ear without spontaneous rupture of tympanic membrane, recurrence not specified  -     ciprofloxacin (CIPRO) 500 MG tablet; Take 1 tablet by mouth 2 (Two) Times a Day for 7 days.  -     fluticasone (FLONASE) 50 MCG/ACT nasal spray; Instill 2 sprays into each nostril Daily    Acute pansinusitis, recurrence not specified  -     ciprofloxacin (CIPRO) 500 MG tablet; Take 1 tablet by mouth 2 (Two) Times a Day for 7 days.  -     fluticasone (FLONASE) 50 MCG/ACT nasal spray; Instill 2 sprays into each nostril Daily    Acute bronchitis, unspecified organism  - "     ciprofloxacin (CIPRO) 500 MG tablet; Take 1 tablet by mouth 2 (Two) Times a Day for 7 days.    Given sample of Dulera inhaler 200/5 and instructed on proper use as well as rinsing mouth after each use.       F/u if symptoms worsen or persist. Continue Sudafed prn.

## 2017-06-13 RX ORDER — DEXTROAMPHETAMINE SACCHARATE, AMPHETAMINE ASPARTATE MONOHYDRATE, DEXTROAMPHETAMINE SULFATE AND AMPHETAMINE SULFATE 6.25; 6.25; 6.25; 6.25 MG/1; MG/1; MG/1; MG/1
25 CAPSULE, EXTENDED RELEASE ORAL EVERY MORNING
Qty: 30 CAPSULE | Refills: 0 | Status: SHIPPED | OUTPATIENT
Start: 2017-06-13 | End: 2017-07-21 | Stop reason: SDUPTHER

## 2017-06-13 RX ORDER — OMEPRAZOLE 20 MG/1
CAPSULE, DELAYED RELEASE ORAL
Qty: 30 CAPSULE | Refills: 5 | Status: SHIPPED | OUTPATIENT
Start: 2017-06-13 | End: 2017-08-29

## 2017-06-15 ENCOUNTER — OFFICE VISIT (OUTPATIENT)
Dept: PSYCHIATRY | Facility: CLINIC | Age: 47
End: 2017-06-15

## 2017-06-15 DIAGNOSIS — F43.21 UNRESOLVED GRIEF: ICD-10-CM

## 2017-06-15 DIAGNOSIS — Z86.59 HISTORY OF POSTTRAUMATIC STRESS DISORDER (PTSD): Primary | ICD-10-CM

## 2017-06-15 DIAGNOSIS — F32.89 OTHER DEPRESSION: ICD-10-CM

## 2017-06-15 PROCEDURE — 90791 PSYCH DIAGNOSTIC EVALUATION: CPT | Performed by: SOCIAL WORKER

## 2017-06-16 ENCOUNTER — TELEPHONE (OUTPATIENT)
Dept: INTERNAL MEDICINE | Facility: CLINIC | Age: 47
End: 2017-06-16

## 2017-06-16 RX ORDER — AZITHROMYCIN 250 MG/1
TABLET, FILM COATED ORAL
Qty: 6 TABLET | Refills: 0 | Status: SHIPPED | OUTPATIENT
Start: 2017-06-16 | End: 2017-06-27
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
## 2017-06-26 ENCOUNTER — CLINICAL SUPPORT (OUTPATIENT)
Dept: OBSTETRICS AND GYNECOLOGY | Facility: CLINIC | Age: 47
End: 2017-06-26

## 2017-06-26 DIAGNOSIS — Z79.890 POSTMENOPAUSAL HRT (HORMONE REPLACEMENT THERAPY): Primary | ICD-10-CM

## 2017-06-26 PROCEDURE — 96372 THER/PROPH/DIAG INJ SC/IM: CPT | Performed by: OBSTETRICS & GYNECOLOGY

## 2017-06-27 VITALS
HEIGHT: 62 IN | DIASTOLIC BLOOD PRESSURE: 74 MMHG | BODY MASS INDEX: 27.05 KG/M2 | WEIGHT: 147 LBS | SYSTOLIC BLOOD PRESSURE: 122 MMHG

## 2017-07-24 ENCOUNTER — CLINICAL SUPPORT (OUTPATIENT)
Dept: OBSTETRICS AND GYNECOLOGY | Facility: CLINIC | Age: 47
End: 2017-07-24

## 2017-07-24 VITALS
SYSTOLIC BLOOD PRESSURE: 124 MMHG | BODY MASS INDEX: 27.05 KG/M2 | DIASTOLIC BLOOD PRESSURE: 72 MMHG | WEIGHT: 147 LBS | HEIGHT: 62 IN

## 2017-07-24 DIAGNOSIS — Z79.890 HORMONE REPLACEMENT THERAPY (HRT): Primary | ICD-10-CM

## 2017-07-24 PROCEDURE — 96372 THER/PROPH/DIAG INJ SC/IM: CPT | Performed by: OBSTETRICS & GYNECOLOGY

## 2017-07-24 RX ORDER — DEXTROAMPHETAMINE SACCHARATE, AMPHETAMINE ASPARTATE MONOHYDRATE, DEXTROAMPHETAMINE SULFATE AND AMPHETAMINE SULFATE 6.25; 6.25; 6.25; 6.25 MG/1; MG/1; MG/1; MG/1
25 CAPSULE, EXTENDED RELEASE ORAL EVERY MORNING
Qty: 30 CAPSULE | Refills: 0 | Status: SHIPPED | OUTPATIENT
Start: 2017-07-24 | End: 2017-08-21 | Stop reason: SDUPTHER

## 2017-08-21 ENCOUNTER — OFFICE VISIT (OUTPATIENT)
Dept: PSYCHIATRY | Facility: CLINIC | Age: 47
End: 2017-08-21

## 2017-08-21 ENCOUNTER — CLINICAL SUPPORT (OUTPATIENT)
Dept: OBSTETRICS AND GYNECOLOGY | Facility: CLINIC | Age: 47
End: 2017-08-21

## 2017-08-21 VITALS
HEIGHT: 62 IN | DIASTOLIC BLOOD PRESSURE: 64 MMHG | BODY MASS INDEX: 26.87 KG/M2 | SYSTOLIC BLOOD PRESSURE: 126 MMHG | WEIGHT: 146 LBS

## 2017-08-21 DIAGNOSIS — F32.89 OTHER DEPRESSION: ICD-10-CM

## 2017-08-21 DIAGNOSIS — Z79.890 POSTMENOPAUSAL HRT (HORMONE REPLACEMENT THERAPY): Primary | ICD-10-CM

## 2017-08-21 DIAGNOSIS — Z86.59 HISTORY OF POSTTRAUMATIC STRESS DISORDER (PTSD): Primary | ICD-10-CM

## 2017-08-21 DIAGNOSIS — F43.21 UNRESOLVED GRIEF: ICD-10-CM

## 2017-08-21 PROCEDURE — 90834 PSYTX W PT 45 MINUTES: CPT | Performed by: SOCIAL WORKER

## 2017-08-21 PROCEDURE — 96372 THER/PROPH/DIAG INJ SC/IM: CPT | Performed by: OBSTETRICS & GYNECOLOGY
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
## 2017-08-22 RX ORDER — DEXTROAMPHETAMINE SACCHARATE, AMPHETAMINE ASPARTATE MONOHYDRATE, DEXTROAMPHETAMINE SULFATE AND AMPHETAMINE SULFATE 6.25; 6.25; 6.25; 6.25 MG/1; MG/1; MG/1; MG/1
25 CAPSULE, EXTENDED RELEASE ORAL EVERY MORNING
Qty: 30 CAPSULE | Refills: 0 | Status: SHIPPED | OUTPATIENT
Start: 2017-08-22 | End: 2017-09-27 | Stop reason: SDUPTHER

## 2017-08-22 NOTE — TELEPHONE ENCOUNTER
Kg printed, script pended to be signed.        When Pt called for refill she said she had a blood clot and it was getting much worse. I advised Pt she should go to ER to be seen for this as they could get test results back faster than we could.

## 2017-09-22 ENCOUNTER — CLINICAL SUPPORT (OUTPATIENT)
Dept: OBSTETRICS AND GYNECOLOGY | Facility: CLINIC | Age: 47
End: 2017-09-22

## 2017-09-22 DIAGNOSIS — Z79.890 POSTMENOPAUSAL HRT (HORMONE REPLACEMENT THERAPY): Primary | ICD-10-CM

## 2017-09-22 PROCEDURE — 96372 THER/PROPH/DIAG INJ SC/IM: CPT | Performed by: OBSTETRICS & GYNECOLOGY

## 2017-09-26 RX ORDER — MELOXICAM 15 MG/1
15 TABLET ORAL DAILY
Qty: 30 TABLET | Refills: 2 | Status: CANCELLED | OUTPATIENT
Start: 2017-09-26

## 2017-09-27 ENCOUNTER — OFFICE VISIT (OUTPATIENT)
Dept: INTERNAL MEDICINE | Facility: CLINIC | Age: 47
End: 2017-09-27

## 2017-09-27 VITALS
WEIGHT: 153 LBS | OXYGEN SATURATION: 99 % | DIASTOLIC BLOOD PRESSURE: 68 MMHG | TEMPERATURE: 98.5 F | HEART RATE: 89 BPM | SYSTOLIC BLOOD PRESSURE: 124 MMHG | HEIGHT: 62 IN | BODY MASS INDEX: 28.16 KG/M2

## 2017-09-27 DIAGNOSIS — F32.A ANXIETY AND DEPRESSION: ICD-10-CM

## 2017-09-27 DIAGNOSIS — K21.9 GASTROESOPHAGEAL REFLUX DISEASE WITHOUT ESOPHAGITIS: Primary | ICD-10-CM

## 2017-09-27 DIAGNOSIS — Z12.31 SCREENING MAMMOGRAM, ENCOUNTER FOR: ICD-10-CM

## 2017-09-27 DIAGNOSIS — F41.9 ANXIETY AND DEPRESSION: ICD-10-CM

## 2017-09-27 DIAGNOSIS — F90.2 ATTENTION DEFICIT HYPERACTIVITY DISORDER (ADHD), COMBINED TYPE: ICD-10-CM

## 2017-09-27 LAB
BASOPHILS # BLD AUTO: 0.01 10*3/MM3 (ref 0–0.2)
BASOPHILS NFR BLD AUTO: 0.2 % (ref 0–2.5)
EOSINOPHIL # BLD AUTO: 0.04 10*3/MM3 (ref 0–0.7)
EOSINOPHIL NFR BLD AUTO: 0.8 % (ref 0–7)
ERYTHROCYTE [DISTWIDTH] IN BLOOD BY AUTOMATED COUNT: 12.7 % (ref 11.5–14.5)
HCT VFR BLD AUTO: 37.2 % (ref 37–47)
HGB BLD-MCNC: 12.6 G/DL (ref 12–16)
IMM GRANULOCYTES # BLD: 0.01 10*3/MM3 (ref 0–0.06)
IMM GRANULOCYTES NFR BLD: 0.2 % (ref 0–0.6)
LYMPHOCYTES # BLD AUTO: 1.64 10*3/MM3 (ref 0.6–3.4)
LYMPHOCYTES NFR BLD AUTO: 34.7 % (ref 10–50)
MCH RBC QN AUTO: 29 PG (ref 27–31)
MCHC RBC AUTO-ENTMCNC: 33.9 G/DL (ref 30–37)
MCV RBC AUTO: 85.7 FL (ref 81–99)
MONOCYTES # BLD AUTO: 0.56 10*3/MM3 (ref 0–0.9)
MONOCYTES NFR BLD AUTO: 11.8 % (ref 0–12)
NEUTROPHILS # BLD AUTO: 2.47 10*3/MM3 (ref 2–6.9)
NEUTROPHILS NFR BLD AUTO: 52.3 % (ref 37–80)
NRBC BLD AUTO-RTO: 0 /100 WBC (ref 0–0)
PLATELET # BLD AUTO: 206 10*3/MM3 (ref 130–400)
RBC # BLD AUTO: 4.34 10*6/MM3 (ref 4.2–5.4)
WBC # BLD AUTO: 4.73 10*3/MM3 (ref 4.8–10.8)

## 2017-09-27 PROCEDURE — 99213 OFFICE O/P EST LOW 20 MIN: CPT | Performed by: INTERNAL MEDICINE

## 2017-09-27 PROCEDURE — 90686 IIV4 VACC NO PRSV 0.5 ML IM: CPT | Performed by: INTERNAL MEDICINE

## 2017-09-27 PROCEDURE — G0008 ADMIN INFLUENZA VIRUS VAC: HCPCS | Performed by: INTERNAL MEDICINE

## 2017-09-27 RX ORDER — MELOXICAM 15 MG/1
TABLET ORAL
Qty: 30 TABLET | Refills: 2 | Status: SHIPPED | OUTPATIENT
Start: 2017-09-27 | End: 2017-11-09 | Stop reason: SINTOL

## 2017-09-27 RX ORDER — DEXTROAMPHETAMINE SACCHARATE, AMPHETAMINE ASPARTATE MONOHYDRATE, DEXTROAMPHETAMINE SULFATE AND AMPHETAMINE SULFATE 6.25; 6.25; 6.25; 6.25 MG/1; MG/1; MG/1; MG/1
25 CAPSULE, EXTENDED RELEASE ORAL EVERY MORNING
Qty: 30 CAPSULE | Refills: 0 | Status: SHIPPED | OUTPATIENT
Start: 2017-09-27 | End: 2017-10-31 | Stop reason: SDUPTHER

## 2017-09-27 RX ORDER — LAMOTRIGINE 100 MG/1
100 TABLET ORAL DAILY
Qty: 30 TABLET | Refills: 5 | Status: SHIPPED | OUTPATIENT
Start: 2017-09-27 | End: 2018-06-07 | Stop reason: SDUPTHER

## 2017-09-27 NOTE — PROGRESS NOTES
"Chief Complaint   Patient presents with   • Follow-up     for ADHD, GERD, Anxiety and depression. Pt would like blood clot spot on top of left foot looked at. Requesting refill on Adderall XR.      Subjective   Jazmyn Sinclair is a 47 y.o. female.     HPI Comments: Here for routine followup.  Past medical history of anxiety and depression, gastroesophageal reflux disease, migraine headaches, anemia, chronic back pain status post multiple surgeries, ADHD.  Her GERD is well controlled on zantac qhs.   She still has migraines on and off.    No current allergies.    Her depression and anxiety are currently stable.  Her father  since her last visit.    She continues to see pain clinic regularly.   Her ADHD is stable on current dose of adderall.   She kicked the handle of cast iron skillet a few months ago with left foot.  Still has bruise over dorsum of foot.  She did not get her mammogram done in April.   She gets swelling in legs for a few days a month, maybe once or twice.  It does not effect her breathing.  If she keeps legs up swelling goes down.                The following portions of the patient's history were reviewed and updated as appropriate: allergies, current medications, past family history, past medical history, past social history, past surgical history and problem list.    Review of Systems   Cardiovascular: Positive for leg swelling.   Gastrointestinal:        Rare GERD symptoms   Musculoskeletal: Positive for back pain.        Bruise over dorsum of left foot   Neurological: Positive for headaches.   Psychiatric/Behavioral: Negative for dysphoric mood and sleep disturbance. The patient is not nervous/anxious.    All other systems reviewed and are negative.      Objective   /68  Pulse 89  Temp 98.5 °F (36.9 °C)  Ht 62\" (157.5 cm)  Wt 153 lb (69.4 kg)  LMP  (LMP Unknown)  SpO2 99%  BMI 27.98 kg/m2  Body mass index is 27.98 kg/(m^2).  Physical Exam   Constitutional: She is oriented to " person, place, and time. She appears well-developed and well-nourished.   HENT:   Head: Normocephalic and atraumatic.   Mouth/Throat: Oropharynx is clear and moist.   Eyes: Conjunctivae and EOM are normal. Pupils are equal, round, and reactive to light.   Neck: Normal range of motion. Neck supple. No thyromegaly present.   Cardiovascular: Normal rate, regular rhythm, normal heart sounds and intact distal pulses.    No murmur heard.  Pulmonary/Chest: Effort normal and breath sounds normal. No respiratory distress.   Abdominal: Soft. Bowel sounds are normal.   Musculoskeletal: She exhibits no edema.   Dorsum of left foot with approximate 2 cm area of bruising and tenderness   Lymphadenopathy:     She has no cervical adenopathy.   Neurological: She is alert and oriented to person, place, and time. No cranial nerve deficit.   Psychiatric: She has a normal mood and affect. Judgment normal.   Nursing note and vitals reviewed.      Assessment/Plan   Jazmyn Sinclair is here today and the following problems have been addressed:      Jazmyn was seen today for follow-up.    Diagnoses and all orders for this visit:    Gastroesophageal reflux disease without esophagitis  -     CBC & Differential    Attention deficit hyperactivity disorder (ADHD), combined type    Anxiety and depression    Screening mammogram, encounter for  -     Mammo Screening Digital Tomosynthesis Bilateral With CAD; Future    Other orders  -     lamoTRIgine (LaMICtal) 100 MG tablet; Take 1 tablet by mouth once Daily.  -     amphetamine-dextroamphetamine XR (ADDERALL XR) 25 MG 24 hr capsule; Take 1 capsule by mouth Every Morning.    Avoid eating spicy foods  Avoid caffeinated beverages  Avoid carbonated beverages  Do not eat or drink within 2-3 hours of going to bed in the evening  Eat smaller portions of food throughout the day  RF given on adderall  Flu shot given today  Repeat CBC due to elevated lymphocytes  Mammogram ordered, patient will schedule her own  appointment    RTC 6 mo    Please note that portions of this note were completed with a voice recognition program.  Efforts were made to edit dictation, but occasionally words are mistranscribed.

## 2017-09-28 ENCOUNTER — TELEPHONE (OUTPATIENT)
Dept: INTERNAL MEDICINE | Facility: CLINIC | Age: 47
End: 2017-09-28

## 2017-09-28 NOTE — TELEPHONE ENCOUNTER
----- Message from Marilyn K Vermeesch, MD sent at 9/28/2017  7:28 AM EDT -----  Please tell patient her lymphocyte count is now in normal range.

## 2017-10-23 ENCOUNTER — CLINICAL SUPPORT (OUTPATIENT)
Dept: OBSTETRICS AND GYNECOLOGY | Facility: CLINIC | Age: 47
End: 2017-10-23

## 2017-10-23 VITALS
DIASTOLIC BLOOD PRESSURE: 64 MMHG | WEIGHT: 153 LBS | HEIGHT: 62 IN | BODY MASS INDEX: 28.16 KG/M2 | SYSTOLIC BLOOD PRESSURE: 126 MMHG

## 2017-10-23 DIAGNOSIS — Z79.890 POSTMENOPAUSAL HRT (HORMONE REPLACEMENT THERAPY): Primary | ICD-10-CM

## 2017-10-23 PROCEDURE — 96372 THER/PROPH/DIAG INJ SC/IM: CPT | Performed by: OBSTETRICS & GYNECOLOGY

## 2017-10-31 ENCOUNTER — OFFICE VISIT (OUTPATIENT)
Dept: INTERNAL MEDICINE | Facility: CLINIC | Age: 47
End: 2017-10-31

## 2017-10-31 VITALS
TEMPERATURE: 99 F | HEIGHT: 62 IN | WEIGHT: 160 LBS | HEART RATE: 83 BPM | OXYGEN SATURATION: 98 % | DIASTOLIC BLOOD PRESSURE: 76 MMHG | SYSTOLIC BLOOD PRESSURE: 118 MMHG | BODY MASS INDEX: 29.44 KG/M2

## 2017-10-31 DIAGNOSIS — R06.83 SNORING: ICD-10-CM

## 2017-10-31 DIAGNOSIS — R60.0 LOCALIZED EDEMA: Primary | ICD-10-CM

## 2017-10-31 PROCEDURE — 99213 OFFICE O/P EST LOW 20 MIN: CPT | Performed by: INTERNAL MEDICINE

## 2017-10-31 RX ORDER — DEXTROAMPHETAMINE SACCHARATE, AMPHETAMINE ASPARTATE MONOHYDRATE, DEXTROAMPHETAMINE SULFATE AND AMPHETAMINE SULFATE 6.25; 6.25; 6.25; 6.25 MG/1; MG/1; MG/1; MG/1
25 CAPSULE, EXTENDED RELEASE ORAL EVERY MORNING
Qty: 30 CAPSULE | Refills: 0 | Status: SHIPPED | OUTPATIENT
Start: 2017-10-31 | End: 2017-12-12 | Stop reason: SDUPTHER

## 2017-10-31 NOTE — PROGRESS NOTES
"Chief Complaint   Patient presents with   • Edema     in legs, ankles, and feet. Requesting refill on Adderall.      Subjective   Jazmyn Sinclair is a 47 y.o. female.     HPI Comments: Here with complaints of edema in legs.   Her leg edema comes and goes.   She denies CP, SOB and edema.   She has had this on and off for about 3 yrs.   Had a cardiac work up and it was normal.  She does snore at times, but is uncertain if she has any apnea.  This edema occurs about once a month, sometimes 2-3 times a month.  Patient denies excessive sodium intake, she does not drink sodas, and rarely goes out to eat.  She had an ECHO that revealed mild TR and PAP 24/16.              The following portions of the patient's history were reviewed and updated as appropriate: allergies, current medications, past family history, past medical history, past social history, past surgical history and problem list.    Review of Systems   Constitutional: Positive for unexpected weight change.        Weight gain of 7 pounds over past 1 week likely due to edema   Respiratory: Negative for shortness of breath.         Snoring, but no known apnea   Cardiovascular: Positive for leg swelling. Negative for chest pain and palpitations.   Gastrointestinal: Negative.    Genitourinary: Negative.    Musculoskeletal: Positive for back pain.   All other systems reviewed and are negative.      Objective   /76  Pulse 83  Temp 99 °F (37.2 °C)  Ht 62\" (157.5 cm)  Wt 160 lb (72.6 kg)  LMP  (LMP Unknown)  SpO2 98%  BMI 29.26 kg/m2  Body mass index is 29.26 kg/(m^2).  Physical Exam   Constitutional: She is oriented to person, place, and time. She appears well-developed and well-nourished.   HENT:   Head: Normocephalic and atraumatic.   Eyes: EOM are normal. Pupils are equal, round, and reactive to light.   Cardiovascular: Normal rate, regular rhythm, normal heart sounds and intact distal pulses.    No murmur heard.  Pulmonary/Chest: Effort normal and " breath sounds normal. No respiratory distress. She has no wheezes. She has no rales.   Abdominal: Soft. Bowel sounds are normal. She exhibits no distension. There is no tenderness.   No hepatosplenomegaly   Musculoskeletal: She exhibits edema.   +2 pitting edema to knees bilaterally   Neurological: She is alert and oriented to person, place, and time.   Psychiatric: She has a normal mood and affect. Judgment normal.   Nursing note and vitals reviewed.      Assessment/Plan   Jazmyn Sinclair is here today and the following problems have been addressed:      Jazmyn was seen today for edema.    Diagnoses and all orders for this visit:    Localized edema  -     Ambulatory Referral to Sleep Medicine  -     Protein, Urine, 24 Hour - Urine, Clean Catch    Snoring  -     Ambulatory Referral to Sleep Medicine    Other orders  -     amphetamine-dextroamphetamine XR (ADDERALL XR) 25 MG 24 hr capsule; Take 1 capsule by mouth Every Morning.    24 hour urine for protein  Refer for sleep study  Avoid excessive sodium  Elevate legs  Will call her with results  Of note patient does take Mobic 15 mg daily for chronic back pain in this could be contributing to her edema    RTC prn or as scheduled  Please note that portions of this note were completed with a voice recognition program.  Efforts were made to edit dictation, but occasionally words are mistranscribed.

## 2017-11-08 LAB
PROT 24H UR-MRATE: 290 MG/24HOURS (ref 42–225)
PROT UR-MCNC: 10 MG/DL (ref 0–11.9)

## 2017-11-09 ENCOUNTER — TELEPHONE (OUTPATIENT)
Dept: INTERNAL MEDICINE | Facility: CLINIC | Age: 47
End: 2017-11-09

## 2017-11-09 NOTE — TELEPHONE ENCOUNTER
----- Message from Marilyn K Vermeesch, MD sent at 11/9/2017  7:33 AM EST -----  Please tell patient she has slightly elevated protein in urine but I do not believe this is cause of her edema in legs.  I would like her to discontinue Mobic at this time to see if edema resolves.  This is a common side effect of anti-inflammatories   in some people and I would like to see if her edema resolves with discontinuing this medication.

## 2017-11-21 ENCOUNTER — CLINICAL SUPPORT (OUTPATIENT)
Dept: OBSTETRICS AND GYNECOLOGY | Facility: CLINIC | Age: 47
End: 2017-11-21

## 2017-11-21 VITALS
HEIGHT: 62 IN | SYSTOLIC BLOOD PRESSURE: 126 MMHG | DIASTOLIC BLOOD PRESSURE: 66 MMHG | BODY MASS INDEX: 29.26 KG/M2 | WEIGHT: 159 LBS

## 2017-11-21 DIAGNOSIS — Z79.890 POSTMENOPAUSAL HRT (HORMONE REPLACEMENT THERAPY): ICD-10-CM

## 2017-11-21 PROCEDURE — 96372 THER/PROPH/DIAG INJ SC/IM: CPT | Performed by: OBSTETRICS & GYNECOLOGY

## 2017-11-22 RX ORDER — GUANFACINE 2 MG/1
2 TABLET ORAL NIGHTLY
Qty: 30 TABLET | Refills: 5 | Status: SHIPPED | OUTPATIENT
Start: 2017-11-22 | End: 2018-08-20

## 2017-12-12 RX ORDER — DEXTROAMPHETAMINE SACCHARATE, AMPHETAMINE ASPARTATE MONOHYDRATE, DEXTROAMPHETAMINE SULFATE AND AMPHETAMINE SULFATE 6.25; 6.25; 6.25; 6.25 MG/1; MG/1; MG/1; MG/1
25 CAPSULE, EXTENDED RELEASE ORAL EVERY MORNING
Qty: 30 CAPSULE | Refills: 0 | Status: SHIPPED | OUTPATIENT
Start: 2017-12-12 | End: 2018-01-15 | Stop reason: SDUPTHER

## 2017-12-12 NOTE — TELEPHONE ENCOUNTER
Spoke with Pt, states that she's been having the swelling for years and that she hadn't been taking Mobic that long.    Kg printed, script pended for you to print

## 2017-12-12 NOTE — TELEPHONE ENCOUNTER
PATIENT CALLED AND NEEDED TO TALK TO YOU ABOUT A MESSAGE YOU LEFT HER. SHE WOULD ALSO LIKE FOR YOU TO GET HER REFILL READY SHE IS OUT OF THE MEDICATION.

## 2017-12-20 ENCOUNTER — CLINICAL SUPPORT (OUTPATIENT)
Dept: OBSTETRICS AND GYNECOLOGY | Facility: CLINIC | Age: 47
End: 2017-12-20

## 2017-12-20 VITALS
HEIGHT: 62 IN | SYSTOLIC BLOOD PRESSURE: 128 MMHG | WEIGHT: 160 LBS | BODY MASS INDEX: 29.44 KG/M2 | DIASTOLIC BLOOD PRESSURE: 68 MMHG

## 2017-12-20 DIAGNOSIS — Z79.890 POSTMENOPAUSAL HRT (HORMONE REPLACEMENT THERAPY): ICD-10-CM

## 2017-12-20 PROCEDURE — 96372 THER/PROPH/DIAG INJ SC/IM: CPT | Performed by: OBSTETRICS & GYNECOLOGY

## 2018-01-15 RX ORDER — DEXTROAMPHETAMINE SACCHARATE, AMPHETAMINE ASPARTATE MONOHYDRATE, DEXTROAMPHETAMINE SULFATE AND AMPHETAMINE SULFATE 6.25; 6.25; 6.25; 6.25 MG/1; MG/1; MG/1; MG/1
25 CAPSULE, EXTENDED RELEASE ORAL EVERY MORNING
Qty: 30 CAPSULE | Refills: 0 | Status: SHIPPED | OUTPATIENT
Start: 2018-01-15 | End: 2018-02-15 | Stop reason: SDUPTHER

## 2018-01-15 NOTE — TELEPHONE ENCOUNTER
Informed that we will call when ready to be picked up. States that she will call later if she has not heard back. Says that every time she leaves a message with the front that it never gets done. Informed that this is Dr. Vermeesch's half day.

## 2018-01-17 ENCOUNTER — OFFICE VISIT (OUTPATIENT)
Dept: OBSTETRICS AND GYNECOLOGY | Facility: CLINIC | Age: 48
End: 2018-01-17

## 2018-01-17 ENCOUNTER — CLINICAL SUPPORT (OUTPATIENT)
Dept: OBSTETRICS AND GYNECOLOGY | Facility: CLINIC | Age: 48
End: 2018-01-17

## 2018-01-17 VITALS
SYSTOLIC BLOOD PRESSURE: 128 MMHG | HEIGHT: 64 IN | BODY MASS INDEX: 27.49 KG/M2 | WEIGHT: 161 LBS | DIASTOLIC BLOOD PRESSURE: 68 MMHG

## 2018-01-17 VITALS
DIASTOLIC BLOOD PRESSURE: 68 MMHG | WEIGHT: 161 LBS | HEIGHT: 64 IN | SYSTOLIC BLOOD PRESSURE: 128 MMHG | BODY MASS INDEX: 27.49 KG/M2

## 2018-01-17 DIAGNOSIS — Z79.890 POSTMENOPAUSAL HRT (HORMONE REPLACEMENT THERAPY): ICD-10-CM

## 2018-01-17 PROCEDURE — 96372 THER/PROPH/DIAG INJ SC/IM: CPT | Performed by: OBSTETRICS & GYNECOLOGY

## 2018-01-17 NOTE — PROGRESS NOTES
Jazmyn was seen today for an injection for Postmenopausal hormone replacement therapy. Given in the right upper outer quadrant. 7.5mg. To follow up in 4 weeks.

## 2018-02-15 RX ORDER — DEXTROAMPHETAMINE SACCHARATE, AMPHETAMINE ASPARTATE MONOHYDRATE, DEXTROAMPHETAMINE SULFATE AND AMPHETAMINE SULFATE 6.25; 6.25; 6.25; 6.25 MG/1; MG/1; MG/1; MG/1
25 CAPSULE, EXTENDED RELEASE ORAL EVERY MORNING
Qty: 30 CAPSULE | Refills: 0 | Status: SHIPPED | OUTPATIENT
Start: 2018-02-15 | End: 2018-03-28 | Stop reason: SDUPTHER

## 2018-02-16 ENCOUNTER — CLINICAL SUPPORT (OUTPATIENT)
Dept: OBSTETRICS AND GYNECOLOGY | Facility: CLINIC | Age: 48
End: 2018-02-16

## 2018-02-16 VITALS
DIASTOLIC BLOOD PRESSURE: 74 MMHG | BODY MASS INDEX: 27.66 KG/M2 | WEIGHT: 162 LBS | SYSTOLIC BLOOD PRESSURE: 128 MMHG | HEIGHT: 64 IN

## 2018-02-16 DIAGNOSIS — Z79.890 POSTMENOPAUSAL HRT (HORMONE REPLACEMENT THERAPY): ICD-10-CM

## 2018-02-16 PROCEDURE — 96372 THER/PROPH/DIAG INJ SC/IM: CPT | Performed by: OBSTETRICS & GYNECOLOGY

## 2018-02-20 ENCOUNTER — OFFICE VISIT (OUTPATIENT)
Dept: PULMONOLOGY | Facility: CLINIC | Age: 48
End: 2018-02-20

## 2018-02-20 VITALS
RESPIRATION RATE: 17 BRPM | HEART RATE: 92 BPM | DIASTOLIC BLOOD PRESSURE: 72 MMHG | OXYGEN SATURATION: 98 % | HEIGHT: 64 IN | WEIGHT: 162 LBS | BODY MASS INDEX: 27.66 KG/M2 | SYSTOLIC BLOOD PRESSURE: 102 MMHG

## 2018-02-20 DIAGNOSIS — R06.83 SNORING: Primary | ICD-10-CM

## 2018-02-20 PROCEDURE — 99203 OFFICE O/P NEW LOW 30 MIN: CPT | Performed by: INTERNAL MEDICINE

## 2018-02-20 NOTE — PROGRESS NOTES
CONSULT NOTE    Requested by:   Vermeesch, Marilyn K, MD Marilyn K. Vermeesch, MD      Chief Complaint   Patient presents with   • Consult   • Sleeping Problem       Subjective   Jazmyn Sinclair is a 47 y.o. female.     History of Present Illness   Patient comes in today for consultation because of lower extremity swelling and snoring.    The patient says that for the past year or so she's struggling because of lower extremity swelling that happened without any warning or triggers.  The patient has had multiple investigations by the cause has not been identified yet.    The patient says that she occasionally snores on her back and her primary care provider wanted to rule out sleep apnea as an etiology.    The patient denies any shortness of breath associated with lower extremity edema.    The patient's denies any daytime sleepiness or tiredness.  The patient says that occasionally she does not go to sleep until 1 AM in the morning and the next morning she does feel a little sleepy and drowsy but if she is able to obtain 6-7 hours of sleep that she is usually fresh.    As far as the lower extremity swelling is concerned, it does not happen every night and only happens once or twice a month and sometimes she has gone 2 months without having any issues.    She denies any history of smoking currently although used to smoke three quarters of a pack per day for about 18 years.    The following portions of the patient's history were reviewed and updated as appropriate: allergies, current medications, past family history, past medical history, past social history and past surgical history.    Review of Systems   Constitutional: Positive for fatigue. Negative for chills and fever.   HENT: Negative for postnasal drip, rhinorrhea, sinus pressure, sneezing and sore throat.    Respiratory: Negative for cough, chest tightness, shortness of breath and wheezing.    Cardiovascular: Positive for leg swelling. Negative for chest pain  "and palpitations.   Psychiatric/Behavioral: Positive for sleep disturbance.   All other systems reviewed and are negative.      Past Medical History:   Diagnosis Date   • Acid reflux    • Anemia    • Anxiety    • Arthritis    • Depression    • Fractures    • GERD (gastroesophageal reflux disease)    • Kidney infection    • Migraine    • Ovarian cyst    • Pap smear for cervical cancer screening     Nov 2016       Social History   Substance Use Topics   • Smoking status: Former Smoker     Packs/day: 1.00     Years: 20.00     Types: Cigarettes     Quit date: 6/20/2015   • Smokeless tobacco: Never Used   • Alcohol use No         Objective   Visit Vitals   • /72   • Pulse 92   • Resp 17   • Ht 162.6 cm (64.02\")   • Wt 73.5 kg (162 lb)   • LMP  (LMP Unknown)   • SpO2 98%   • BMI 27.79 kg/m2       Physical Exam   Constitutional: She is oriented to person, place, and time. She appears well-developed and well-nourished.   HENT:   Head: Atraumatic.   Eyes: EOM are normal.   Neck: Neck supple. No JVD present. No thyromegaly present.   Cardiovascular: Normal rate and regular rhythm.    No murmur heard.  Pulmonary/Chest: Effort normal. No respiratory distress. She has no wheezes. She has no rales.   Musculoskeletal:   Gait was normal.   Neurological: She is alert and oriented to person, place, and time.   Skin: Skin is warm and dry.   Psychiatric: She has a normal mood and affect. Her behavior is normal.   Vitals reviewed.      Assessment/Plan   Jazmyn was seen today for consult and sleeping problem.    Diagnoses and all orders for this visit:    Snoring         Return if symptoms worsen or fail to improve.    DISCUSSION(if any):  I had a very long discussion with the patient about her symptoms and told her that none of the symptoms are suggestive of obstructive sleep apnea.    I reviewed her echocardiogram which did not show any significant elevation of pulmonary artery pressures although this was performed in " 2014.    I've asked her to follow closely with her primary care provider and if the symptoms change or if she has any worsening then she may be considered for sleep study but at least clinically, at this time, there is no suggestion of sleep apnea in this patient.    As far as lower extremity swelling is concerned, I told the patient that it is unclear with a history at least, as to what might be causing it.  I told her to discuss the possibility of lymphedema versus autoimmune disease with her primary care provider.  I also told her that many etiologies can cause lower extremity edema although her symptoms are somewhat difficult to pinpoint to 1 particular etiology.    I will be more than happy to see this patient again if I could be of any further assistance.    Dictated utilizing Dragon dictation.    This document was electronically signed by Dorothy Burt MD February 20, 2018  12:34 PM

## 2018-03-28 ENCOUNTER — OFFICE VISIT (OUTPATIENT)
Dept: INTERNAL MEDICINE | Facility: CLINIC | Age: 48
End: 2018-03-28

## 2018-03-28 ENCOUNTER — CLINICAL SUPPORT (OUTPATIENT)
Dept: OBSTETRICS AND GYNECOLOGY | Facility: CLINIC | Age: 48
End: 2018-03-28

## 2018-03-28 VITALS
WEIGHT: 163 LBS | SYSTOLIC BLOOD PRESSURE: 124 MMHG | BODY MASS INDEX: 27.83 KG/M2 | DIASTOLIC BLOOD PRESSURE: 72 MMHG | HEIGHT: 64 IN

## 2018-03-28 VITALS
SYSTOLIC BLOOD PRESSURE: 120 MMHG | OXYGEN SATURATION: 98 % | HEIGHT: 64 IN | WEIGHT: 163 LBS | DIASTOLIC BLOOD PRESSURE: 72 MMHG | HEART RATE: 88 BPM | TEMPERATURE: 98.4 F | BODY MASS INDEX: 27.83 KG/M2

## 2018-03-28 DIAGNOSIS — F32.A ANXIETY AND DEPRESSION: ICD-10-CM

## 2018-03-28 DIAGNOSIS — R60.0 LOCALIZED EDEMA: ICD-10-CM

## 2018-03-28 DIAGNOSIS — F41.9 ANXIETY AND DEPRESSION: ICD-10-CM

## 2018-03-28 DIAGNOSIS — K21.9 GASTROESOPHAGEAL REFLUX DISEASE WITHOUT ESOPHAGITIS: Primary | ICD-10-CM

## 2018-03-28 DIAGNOSIS — F90.2 ATTENTION DEFICIT HYPERACTIVITY DISORDER (ADHD), COMBINED TYPE: ICD-10-CM

## 2018-03-28 DIAGNOSIS — Z79.890 HORMONE REPLACEMENT THERAPY: ICD-10-CM

## 2018-03-28 PROCEDURE — 99213 OFFICE O/P EST LOW 20 MIN: CPT | Performed by: INTERNAL MEDICINE

## 2018-03-28 PROCEDURE — 96372 THER/PROPH/DIAG INJ SC/IM: CPT | Performed by: OBSTETRICS & GYNECOLOGY

## 2018-03-28 RX ORDER — DEXTROAMPHETAMINE SACCHARATE, AMPHETAMINE ASPARTATE MONOHYDRATE, DEXTROAMPHETAMINE SULFATE AND AMPHETAMINE SULFATE 6.25; 6.25; 6.25; 6.25 MG/1; MG/1; MG/1; MG/1
25 CAPSULE, EXTENDED RELEASE ORAL EVERY MORNING
Qty: 30 CAPSULE | Refills: 0 | Status: SHIPPED | OUTPATIENT
Start: 2018-03-28 | End: 2018-04-30 | Stop reason: SDUPTHER

## 2018-03-28 RX ORDER — OMEPRAZOLE 20 MG/1
20 CAPSULE, DELAYED RELEASE ORAL DAILY
Qty: 30 CAPSULE | Refills: 6 | Status: SHIPPED | OUTPATIENT
Start: 2018-03-28 | End: 2019-02-11 | Stop reason: SDUPTHER
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
## 2018-03-29 ENCOUNTER — TELEPHONE (OUTPATIENT)
Dept: INTERNAL MEDICINE | Facility: CLINIC | Age: 48
End: 2018-03-29

## 2018-03-29 LAB
ANA SER QL: NEGATIVE
DSDNA AB SER-ACNC: <1 IU/ML (ref 0–9)

## 2018-03-29 NOTE — TELEPHONE ENCOUNTER
----- Message from Marilyn K Vermeesch, MD sent at 3/29/2018 12:22 PM EDT -----  Please tell patient lupus test is negative.

## 2018-04-25 ENCOUNTER — CLINICAL SUPPORT (OUTPATIENT)
Dept: OBSTETRICS AND GYNECOLOGY | Facility: CLINIC | Age: 48
End: 2018-04-25

## 2018-04-25 VITALS
WEIGHT: 163 LBS | HEIGHT: 64 IN | SYSTOLIC BLOOD PRESSURE: 120 MMHG | BODY MASS INDEX: 27.83 KG/M2 | DIASTOLIC BLOOD PRESSURE: 70 MMHG

## 2018-04-25 DIAGNOSIS — N95.1 MENOPAUSAL SYMPTOMS: ICD-10-CM

## 2018-04-25 PROCEDURE — 96372 THER/PROPH/DIAG INJ SC/IM: CPT | Performed by: OBSTETRICS & GYNECOLOGY

## 2018-04-30 RX ORDER — DEXTROAMPHETAMINE SACCHARATE, AMPHETAMINE ASPARTATE MONOHYDRATE, DEXTROAMPHETAMINE SULFATE AND AMPHETAMINE SULFATE 6.25; 6.25; 6.25; 6.25 MG/1; MG/1; MG/1; MG/1
25 CAPSULE, EXTENDED RELEASE ORAL EVERY MORNING
Qty: 30 CAPSULE | Refills: 0 | Status: SHIPPED | OUTPATIENT
Start: 2018-04-30 | End: 2018-06-04 | Stop reason: SDUPTHER

## 2018-05-18 RX ORDER — MELOXICAM 15 MG/1
TABLET ORAL
Qty: 30 TABLET | Refills: 2 | Status: SHIPPED | OUTPATIENT
Start: 2018-05-18 | End: 2018-06-07 | Stop reason: SDUPTHER

## 2018-05-18 RX ORDER — VENLAFAXINE HYDROCHLORIDE 150 MG/1
150 CAPSULE, EXTENDED RELEASE ORAL DAILY
Qty: 30 CAPSULE | Refills: 11 | Status: SHIPPED | OUTPATIENT
Start: 2018-05-18 | End: 2018-08-31 | Stop reason: SDUPTHER

## 2018-05-18 RX ORDER — RANITIDINE 150 MG/1
TABLET ORAL
Qty: 60 TABLET | Refills: 11 | Status: SHIPPED | OUTPATIENT
Start: 2018-05-18 | End: 2018-06-07 | Stop reason: SDUPTHER

## 2018-05-18 RX ORDER — POTASSIUM CHLORIDE 750 MG/1
10 CAPSULE, EXTENDED RELEASE ORAL
Qty: 60 CAPSULE | Refills: 11 | Status: SHIPPED | OUTPATIENT
Start: 2018-05-18 | End: 2019-06-08 | Stop reason: SDUPTHER

## 2018-05-23 ENCOUNTER — APPOINTMENT (OUTPATIENT)
Dept: MAMMOGRAPHY | Facility: HOSPITAL | Age: 48
End: 2018-05-23

## 2018-05-24 ENCOUNTER — CLINICAL SUPPORT (OUTPATIENT)
Dept: OBSTETRICS AND GYNECOLOGY | Facility: CLINIC | Age: 48
End: 2018-05-24

## 2018-05-24 VITALS
DIASTOLIC BLOOD PRESSURE: 70 MMHG | SYSTOLIC BLOOD PRESSURE: 122 MMHG | BODY MASS INDEX: 27.83 KG/M2 | WEIGHT: 163 LBS | HEIGHT: 64 IN

## 2018-05-24 DIAGNOSIS — Z79.890 HORMONE REPLACEMENT THERAPY: ICD-10-CM

## 2018-05-24 PROCEDURE — 96372 THER/PROPH/DIAG INJ SC/IM: CPT | Performed by: OBSTETRICS & GYNECOLOGY

## 2018-06-04 RX ORDER — DEXTROAMPHETAMINE SACCHARATE, AMPHETAMINE ASPARTATE MONOHYDRATE, DEXTROAMPHETAMINE SULFATE AND AMPHETAMINE SULFATE 6.25; 6.25; 6.25; 6.25 MG/1; MG/1; MG/1; MG/1
25 CAPSULE, EXTENDED RELEASE ORAL EVERY MORNING
Qty: 30 CAPSULE | Refills: 0 | Status: SHIPPED | OUTPATIENT
Start: 2018-06-04 | End: 2018-07-06 | Stop reason: SDUPTHER

## 2018-06-07 RX ORDER — MELOXICAM 15 MG/1
TABLET ORAL
Qty: 30 TABLET | Refills: 2 | Status: SHIPPED | OUTPATIENT
Start: 2018-06-07 | End: 2018-11-21 | Stop reason: SDUPTHER

## 2018-06-07 RX ORDER — RANITIDINE 150 MG/1
TABLET ORAL
Qty: 60 TABLET | Refills: 11 | Status: SHIPPED | OUTPATIENT
Start: 2018-06-07 | End: 2018-08-31

## 2018-06-07 RX ORDER — LAMOTRIGINE 100 MG/1
100 TABLET ORAL DAILY
Qty: 30 TABLET | Refills: 5 | Status: CANCELLED | OUTPATIENT
Start: 2018-06-07

## 2018-06-07 RX ORDER — LAMOTRIGINE 100 MG/1
100 TABLET ORAL DAILY
Qty: 30 TABLET | Refills: 5 | Status: SHIPPED | OUTPATIENT
Start: 2018-06-07 | End: 2019-08-03 | Stop reason: SDUPTHER

## 2018-06-28 ENCOUNTER — CLINICAL SUPPORT (OUTPATIENT)
Dept: OBSTETRICS AND GYNECOLOGY | Facility: CLINIC | Age: 48
End: 2018-06-28

## 2018-06-28 ENCOUNTER — HOSPITAL ENCOUNTER (OUTPATIENT)
Dept: MAMMOGRAPHY | Facility: HOSPITAL | Age: 48
Discharge: HOME | End: 2018-06-28
Admitting: INTERNAL MEDICINE

## 2018-06-28 VITALS
WEIGHT: 163 LBS | DIASTOLIC BLOOD PRESSURE: 64 MMHG | HEIGHT: 64 IN | BODY MASS INDEX: 27.83 KG/M2 | SYSTOLIC BLOOD PRESSURE: 118 MMHG

## 2018-06-28 DIAGNOSIS — Z92.29 HISTORY OF POSTMENOPAUSAL HRT: ICD-10-CM

## 2018-06-28 DIAGNOSIS — Z12.31 SCREENING MAMMOGRAM, ENCOUNTER FOR: ICD-10-CM

## 2018-06-28 PROCEDURE — 77063 BREAST TOMOSYNTHESIS BI: CPT

## 2018-06-28 PROCEDURE — 96372 THER/PROPH/DIAG INJ SC/IM: CPT | Performed by: OBSTETRICS & GYNECOLOGY

## 2018-06-28 PROCEDURE — 77067 SCR MAMMO BI INCL CAD: CPT

## 2018-07-06 RX ORDER — DEXTROAMPHETAMINE SACCHARATE, AMPHETAMINE ASPARTATE MONOHYDRATE, DEXTROAMPHETAMINE SULFATE AND AMPHETAMINE SULFATE 6.25; 6.25; 6.25; 6.25 MG/1; MG/1; MG/1; MG/1
25 CAPSULE, EXTENDED RELEASE ORAL EVERY MORNING
Qty: 30 CAPSULE | Refills: 0 | Status: SHIPPED | OUTPATIENT
Start: 2018-07-06 | End: 2018-08-01 | Stop reason: SDUPTHER

## 2018-07-27 ENCOUNTER — CLINICAL SUPPORT (OUTPATIENT)
Dept: OBSTETRICS AND GYNECOLOGY | Facility: CLINIC | Age: 48
End: 2018-07-27

## 2018-07-27 VITALS
WEIGHT: 163 LBS | HEIGHT: 64 IN | BODY MASS INDEX: 27.83 KG/M2 | SYSTOLIC BLOOD PRESSURE: 114 MMHG | DIASTOLIC BLOOD PRESSURE: 70 MMHG

## 2018-07-27 DIAGNOSIS — Z79.890 HORMONE REPLACEMENT THERAPY: ICD-10-CM

## 2018-07-27 PROCEDURE — 96372 THER/PROPH/DIAG INJ SC/IM: CPT | Performed by: OBSTETRICS & GYNECOLOGY

## 2018-08-01 RX ORDER — DEXTROAMPHETAMINE SACCHARATE, AMPHETAMINE ASPARTATE MONOHYDRATE, DEXTROAMPHETAMINE SULFATE AND AMPHETAMINE SULFATE 6.25; 6.25; 6.25; 6.25 MG/1; MG/1; MG/1; MG/1
25 CAPSULE, EXTENDED RELEASE ORAL EVERY MORNING
Qty: 30 CAPSULE | Refills: 0 | Status: SHIPPED | OUTPATIENT
Start: 2018-08-01 | End: 2018-08-02 | Stop reason: SDUPTHER

## 2018-08-02 RX ORDER — DEXTROAMPHETAMINE SACCHARATE, AMPHETAMINE ASPARTATE MONOHYDRATE, DEXTROAMPHETAMINE SULFATE AND AMPHETAMINE SULFATE 6.25; 6.25; 6.25; 6.25 MG/1; MG/1; MG/1; MG/1
25 CAPSULE, EXTENDED RELEASE ORAL EVERY MORNING
Qty: 30 CAPSULE | Refills: 0 | Status: SHIPPED | OUTPATIENT
Start: 2018-08-02 | End: 2018-09-06 | Stop reason: SDUPTHER

## 2018-08-20 RX ORDER — GUANFACINE 2 MG/1
2 TABLET ORAL NIGHTLY
Qty: 30 TABLET | Refills: 0 | Status: SHIPPED | OUTPATIENT
Start: 2018-08-20 | End: 2018-09-18 | Stop reason: SDUPTHER

## 2018-08-31 ENCOUNTER — CLINICAL SUPPORT (OUTPATIENT)
Dept: OBSTETRICS AND GYNECOLOGY | Facility: CLINIC | Age: 48
End: 2018-08-31

## 2018-08-31 ENCOUNTER — OFFICE VISIT (OUTPATIENT)
Dept: INTERNAL MEDICINE | Facility: CLINIC | Age: 48
End: 2018-08-31

## 2018-08-31 VITALS
HEIGHT: 64 IN | SYSTOLIC BLOOD PRESSURE: 120 MMHG | DIASTOLIC BLOOD PRESSURE: 72 MMHG | BODY MASS INDEX: 29.53 KG/M2 | WEIGHT: 173 LBS

## 2018-08-31 VITALS
BODY MASS INDEX: 29.53 KG/M2 | DIASTOLIC BLOOD PRESSURE: 74 MMHG | OXYGEN SATURATION: 99 % | SYSTOLIC BLOOD PRESSURE: 126 MMHG | WEIGHT: 173 LBS | HEART RATE: 91 BPM | HEIGHT: 64 IN | TEMPERATURE: 98.4 F

## 2018-08-31 DIAGNOSIS — Z00.00 MEDICARE ANNUAL WELLNESS VISIT, SUBSEQUENT: Primary | ICD-10-CM

## 2018-08-31 DIAGNOSIS — Z78.0 MENOPAUSE: ICD-10-CM

## 2018-08-31 DIAGNOSIS — F32.A ANXIETY AND DEPRESSION: ICD-10-CM

## 2018-08-31 DIAGNOSIS — F41.9 ANXIETY AND DEPRESSION: ICD-10-CM

## 2018-08-31 DIAGNOSIS — Z79.890 HORMONE REPLACEMENT THERAPY: ICD-10-CM

## 2018-08-31 DIAGNOSIS — Z00.00 PREVENTATIVE HEALTH CARE: ICD-10-CM

## 2018-08-31 DIAGNOSIS — Z12.11 SCREEN FOR COLON CANCER: ICD-10-CM

## 2018-08-31 DIAGNOSIS — Z80.0 FAMILY HISTORY OF COLON CANCER IN FATHER: ICD-10-CM

## 2018-08-31 PROCEDURE — 96372 THER/PROPH/DIAG INJ SC/IM: CPT | Performed by: OBSTETRICS & GYNECOLOGY

## 2018-08-31 PROCEDURE — G0439 PPPS, SUBSEQ VISIT: HCPCS | Performed by: PHYSICIAN ASSISTANT

## 2018-08-31 RX ORDER — CETIRIZINE HYDROCHLORIDE 10 MG/1
10 TABLET ORAL DAILY
Qty: 30 TABLET | Refills: 11 | Status: SHIPPED | OUTPATIENT
Start: 2018-08-31 | End: 2019-09-04 | Stop reason: SDUPTHER

## 2018-08-31 RX ORDER — VENLAFAXINE HYDROCHLORIDE 75 MG/1
225 CAPSULE, EXTENDED RELEASE ORAL DAILY
Qty: 90 CAPSULE | Refills: 5 | Status: SHIPPED | OUTPATIENT
Start: 2018-08-31 | End: 2019-01-06

## 2018-09-04 ENCOUNTER — TELEPHONE (OUTPATIENT)
Dept: SURGERY | Facility: CLINIC | Age: 48
End: 2018-09-04

## 2018-09-06 RX ORDER — DEXTROAMPHETAMINE SACCHARATE, AMPHETAMINE ASPARTATE MONOHYDRATE, DEXTROAMPHETAMINE SULFATE AND AMPHETAMINE SULFATE 6.25; 6.25; 6.25; 6.25 MG/1; MG/1; MG/1; MG/1
25 CAPSULE, EXTENDED RELEASE ORAL EVERY MORNING
Qty: 30 CAPSULE | Refills: 0 | Status: SHIPPED | OUTPATIENT
Start: 2018-09-06 | End: 2018-10-05 | Stop reason: SDUPTHER

## 2018-09-06 NOTE — TELEPHONE ENCOUNTER
----- Message from Jazmyn Sinclair sent at 9/5/2018  8:22 PM EDT -----  Regarding: Prescription Question  Contact: [phone]  this is my first time trying to use this for a refill, so if I do it incorrectly, please let me know and advise terry I'm new at this.   I am in need of a refill on my Adderall XR.   Please advise.  Thank you,  Jazmyn Sinclair

## 2018-09-10 NOTE — TELEPHONE ENCOUNTER
I called pt again, I left another message on her voice mail asking her to return my call to schedule a screening colonoscopy,I will also mail a letter to pt reminding her to call.

## 2018-09-18 RX ORDER — GUANFACINE 2 MG/1
2 TABLET ORAL NIGHTLY
Qty: 30 TABLET | Refills: 0 | Status: SHIPPED | OUTPATIENT
Start: 2018-09-18 | End: 2018-11-05 | Stop reason: SDUPTHER

## 2018-09-21 NOTE — TELEPHONE ENCOUNTER
[address] [place] [address] in the [facility] by [employer] for 2017  Network Utilization Review Department  Phone: [phone]; Fax [phone]  ATTENTION: The Network Utilization Review Department is now centralized for our 7 Facilities  Please call with any questions or concerns to [phone] and carefully follow the prompts so that you are directed to the right person  All voicemails are confidential  Fax any determinations, approvals, denials, and requests for initial or continue stay review clinical to [phone]  Due to HIGH CALL volume, it would be easier if you could please send faxed requests to expedite your requests and in part, help us provide discharge notifications faster   /////////////////////////////////////////////////////////////////////////////////////////////////////////////////    Continued Stay Review    9/19/2018   Excisional debridement of right hip pressure sore     9/19  INTERNAL MED  IVF  DVT prophylaxis  Pressure ulcer , sacrum -  Covered w/dressing (followed by plastics, wound care)  hgb 10 7 - baseline,  Monitor post op   Underweight -  bmi  19 9  -  Nutrition consult  VTE Prophylaxis: Heparin  / sequential compression device     9/20/2018  hgb dropped to 8 5  (2/2 blood loss in surgery & dilutional)  Ck iron panel and ferritin - stop IVF    Sacral Ulcer -  Unable to assess  depth as it tracts deep;   is 2x2 cm sacral decubitus pressure ulcer - followed by plastics -  On abx  (will discuss abx w/ID)     9/20  ID:     Infected right hip wound-status post I and D of the wound     Wound culture previously had grown Morganella - current cx already growing 4+ gram neg  Suspect same organism  The wound did not track down to any osteoarticular structures   -discontinue ceftaroline  -cefepime 2 g IV q 12 hours  -follow up wound cultures and adjust antibiotics as needed  -if the patient is found to have have the same a organism, Morganella, can likely PT CALLED AND NEEDS A REFILL ON HER ADDERALL.    CONFIRMED [facility].    SHE IS COMPLETELY OUT.    THANK YOU.    PT PH#[phone]   transition to oral Bactrim DS tab p o  q 12 hours  -would plan 7 days of antibiotics postoperatively    9/20  PLASTIC SURGERY  Right hip wound inspected    Tract about 5 cm deep by about 2 cm in width  check in 48 hours to see if will heal by itself, otherwise a flap will be necessary (That would be planned for the Monday morning the 24th September)  Await cultures as well to see how antibiotic coverage will be provided  9/21/2018  Benadryl IV for itching  x2 this am  First dose cefepime given IV last evening -  On HOLD this am (? Allergic Reaction)     Vital Signs: BP 91/50 (BP Location: Right arm)   Pulse 71   Temp 97 5 °F (36 4 °C) (Temporal)   Resp 18   Ht 5' 5" (1 651 m)   Wt 54 4 kg (120 lb)   SpO2 95%   Breastfeeding?  No   BMI 19 97 kg/m²     Scheduled Meds:   Current Facility-Administered Medications:  calcium carbonate-vitamin D 1 tablet Oral Daily Dorina Escalera MD    cefepime 2,000 mg Intravenous Q12H Enid Pretty MD Last Rate: Stopped (09/20/18 1800)   cholecalciferol 1,000 Units Oral Daily Dorina Escalera MD    cyanocobalamin 500 mcg Oral Daily Dorina Escalera MD    diphenhydrAMINE 12 5 mg Intravenous Q6H PRN Shantell Boyce MD    heparin (porcine) 5,000 Units Subcutaneous [facility] Dorina Escalera MD    HYDROcodone-acetaminophen 1 tablet Oral Q4H PRN Dorina Escalera MD    melatonin 3 mg Oral HS PRN Shantell Boyce MD    multivitamin-minerals 1 tablet Oral Daily Dorina Escalera MD    ondansetron 4 mg Oral Q6H PRN Dorina Escalera MD    senna-docusate sodium 1 tablet Oral Daily Dorina Escalera MD      Abnormal Labs/Diagnostic Results:   Wbc  7 40  hgb  10 7    8 6   8 5    10 6  Wound cx - gram neg rods     Discharge Plan: tbd

## 2018-09-28 ENCOUNTER — CLINICAL SUPPORT (OUTPATIENT)
Dept: OBSTETRICS AND GYNECOLOGY | Facility: CLINIC | Age: 48
End: 2018-09-28

## 2018-09-28 VITALS
HEIGHT: 64 IN | BODY MASS INDEX: 29.71 KG/M2 | SYSTOLIC BLOOD PRESSURE: 128 MMHG | WEIGHT: 174 LBS | DIASTOLIC BLOOD PRESSURE: 64 MMHG

## 2018-09-28 DIAGNOSIS — Z79.890 HORMONE REPLACEMENT THERAPY (HRT): ICD-10-CM

## 2018-09-28 PROCEDURE — 96372 THER/PROPH/DIAG INJ SC/IM: CPT | Performed by: OBSTETRICS & GYNECOLOGY

## 2018-10-05 ENCOUNTER — OFFICE VISIT (OUTPATIENT)
Dept: INTERNAL MEDICINE | Facility: CLINIC | Age: 48
End: 2018-10-05

## 2018-10-05 ENCOUNTER — RESULTS ENCOUNTER (OUTPATIENT)
Dept: INTERNAL MEDICINE | Facility: CLINIC | Age: 48
End: 2018-10-05

## 2018-10-05 ENCOUNTER — TELEPHONE (OUTPATIENT)
Dept: SURGERY | Facility: CLINIC | Age: 48
End: 2018-10-05

## 2018-10-05 VITALS
SYSTOLIC BLOOD PRESSURE: 128 MMHG | HEART RATE: 95 BPM | TEMPERATURE: 97.2 F | OXYGEN SATURATION: 97 % | DIASTOLIC BLOOD PRESSURE: 82 MMHG

## 2018-10-05 DIAGNOSIS — F90.9 ENCOUNTER FOR MEDICATION MANAGEMENT IN ATTENTION DEFICIT HYPERACTIVITY DISORDER (ADHD): ICD-10-CM

## 2018-10-05 DIAGNOSIS — F32.A ANXIETY AND DEPRESSION: ICD-10-CM

## 2018-10-05 DIAGNOSIS — Z79.899 ENCOUNTER FOR MEDICATION MANAGEMENT IN ATTENTION DEFICIT HYPERACTIVITY DISORDER (ADHD): ICD-10-CM

## 2018-10-05 DIAGNOSIS — K21.9 GASTROESOPHAGEAL REFLUX DISEASE WITHOUT ESOPHAGITIS: ICD-10-CM

## 2018-10-05 DIAGNOSIS — J01.00 SUBACUTE MAXILLARY SINUSITIS: ICD-10-CM

## 2018-10-05 DIAGNOSIS — Z02.83 ENCOUNTER FOR DRUG SCREENING: Primary | ICD-10-CM

## 2018-10-05 DIAGNOSIS — F90.2 ATTENTION DEFICIT HYPERACTIVITY DISORDER (ADHD), COMBINED TYPE: ICD-10-CM

## 2018-10-05 DIAGNOSIS — Z02.83 ENCOUNTER FOR DRUG SCREENING: ICD-10-CM

## 2018-10-05 DIAGNOSIS — F41.9 ANXIETY AND DEPRESSION: ICD-10-CM

## 2018-10-05 PROCEDURE — 99214 OFFICE O/P EST MOD 30 MIN: CPT | Performed by: INTERNAL MEDICINE

## 2018-10-05 RX ORDER — BISACODYL 5 MG/1
10 TABLET, DELAYED RELEASE ORAL 2 TIMES DAILY
Qty: 4 TABLET | Refills: 0 | Status: SHIPPED | OUTPATIENT
Start: 2018-10-05 | End: 2018-10-11

## 2018-10-05 RX ORDER — SULFAMETHOXAZOLE AND TRIMETHOPRIM 800; 160 MG/1; MG/1
1 TABLET ORAL 2 TIMES DAILY WITH MEALS
Qty: 20 TABLET | Refills: 0 | Status: SHIPPED | OUTPATIENT
Start: 2018-10-05 | End: 2018-10-19

## 2018-10-05 RX ORDER — POLYETHYLENE GLYCOL 3350 17 G/17G
POWDER, FOR SOLUTION ORAL
Qty: 238 G | Refills: 0 | Status: SHIPPED | OUTPATIENT
Start: 2018-10-05 | End: 2018-10-11

## 2018-10-05 RX ORDER — DEXTROAMPHETAMINE SACCHARATE, AMPHETAMINE ASPARTATE MONOHYDRATE, DEXTROAMPHETAMINE SULFATE AND AMPHETAMINE SULFATE 6.25; 6.25; 6.25; 6.25 MG/1; MG/1; MG/1; MG/1
25 CAPSULE, EXTENDED RELEASE ORAL EVERY MORNING
Qty: 30 CAPSULE | Refills: 0 | Status: SHIPPED | OUTPATIENT
Start: 2018-10-05 | End: 2018-11-05 | Stop reason: SDUPTHER

## 2018-10-05 NOTE — TELEPHONE ENCOUNTER
I talked with pt, I scheduled her for an open access screening colonoscopy @  on 10/22/2018, all pertinent information mailed to pt.

## 2018-10-05 NOTE — PROGRESS NOTES
Chief Complaint   Patient presents with   • Follow-up     6 months for anxiety, depression, ADHD, and GERD. Pt states she's been congestion, coughing, body aches, chills, and green mucus X 3 weeks.      Subjective   Jazmyn Sinclair is a 48 y.o. female.     Here for follow up of anxiety and depression, gastroesophageal reflux disease, migraine headaches, anemia, chronic back pain status post multiple surgeries, ADHD.  She has been having congestion, HA, green discharge from right side recently.  Did have fevers and chills, now resolved.  Her cough is much improved.  Most sxs are improved except pain over her right cheek and forehead.  She is taking a sinus med over the counter.  Has been sick for about 3 weeks.   Her anxiety and depression and doing well overall.  She is taking effexor  mg daily, dose of medication was recently increased during her August visit.    Her GERD is stable with omeprazole.  She takes her omeprazole on empty stomach daily  Her migraines are well controlled with topamax.  She has not had a headache in almost a year.  Has had sinus headaches over the last 3 weeks though  Continues to see pain clinic for back pain.  There have been no recent changes in her pain medications.  She has noted muscle twitching over the last several months, this is usually worse at night  Remains on same dose of adderall for many yrs, takes it daily.  Her attention deficit is essentially unchanged         The following portions of the patient's history were reviewed and updated as appropriate: allergies, current medications, past family history, past medical history, past social history, past surgical history and problem list.    Review of Systems   Constitutional: Negative for activity change, appetite change and unexpected weight change.   HENT: Positive for congestion, postnasal drip, sinus pain and sinus pressure.    Respiratory: Positive for cough. Negative for shortness of breath.    Cardiovascular:  Negative for chest pain, palpitations and leg swelling.   Gastrointestinal: Negative for abdominal pain.   Neurological: Negative for headaches.   All other systems reviewed and are negative.      Objective   /82   Pulse 95   Temp 97.2 °F (36.2 °C)   LMP  (LMP Unknown)   SpO2 97%   There is no height or weight on file to calculate BMI.  Physical Exam   Constitutional: She is oriented to person, place, and time. She appears well-developed and well-nourished.   HENT:   Head: Normocephalic and atraumatic.   Right Ear: External ear normal.   Left Ear: External ear normal.   Mouth/Throat: Oropharynx is clear and moist.   Tympanic membrane slightly dull, turbinates red and inflamed worse on right, maxillary sinus tenderness over right side only, minimal right frontal sinus tenderness on right side, green postnasal drip noted   Eyes: Pupils are equal, round, and reactive to light. Conjunctivae and EOM are normal.   Neck: Normal range of motion. Neck supple. No thyromegaly present.   Cardiovascular: Normal rate, regular rhythm, normal heart sounds and intact distal pulses.    No murmur heard.  Pulmonary/Chest: Effort normal and breath sounds normal. No respiratory distress. She has no wheezes.   Abdominal: She exhibits no distension. There is no tenderness.   Musculoskeletal: Normal range of motion. She exhibits no edema.   Lymphadenopathy:     She has no cervical adenopathy.   Neurological: She is alert and oriented to person, place, and time. No cranial nerve deficit. Coordination normal.   Psychiatric: She has a normal mood and affect. Her behavior is normal. Judgment and thought content normal.   Nursing note and vitals reviewed.      Assessment/Plan   Jazmyn Sinclair is here today and the following problems have been addressed:      Jazmyn was seen today for follow-up.    Diagnoses and all orders for this visit:    Encounter for drug screening  -     Urine Drug Screen - Urine, Clean Catch; Future    Encounter  for medication management in attention deficit hyperactivity disorder (ADHD)  -     Urine Drug Screen - Urine, Clean Catch; Future    Anxiety and depression    Attention deficit hyperactivity disorder (ADHD), combined type    Gastroesophageal reflux disease without esophagitis    Subacute maxillary sinusitis    Other orders  -     amphetamine-dextroamphetamine XR (ADDERALL XR) 25 MG 24 hr capsule; Take 1 capsule by mouth Every Morning  -     sulfamethoxazole-trimethoprim (BACTRIM DS,SEPTRA DS) 800-160 MG per tablet; Take 1 tablet by mouth 2 (Two) Times a Day With Meals.    continue effexor at current dose, has some muscle twitches that may be due to higher dose of medication that was started approximately 2 months ago  Omeprazole is working well for GERD  Given RF on adderall today, UDS done today, Krista reviewed, no signs of abuse  Given bactrim for right maxillary sinus infection      RTC 6 mo or prn    Krista Report  The patient has read and signed the [facility] Controlled Substance Contract. The patient is aware of the potential for addiction and dependence. A Krista was reviewed and scanned into the chart.  Narcotic contract was signed by patient.   As part of this patient's treatment plan I am prescribing controlled substances. The patient has been made aware of appropriate use of such medications, including potential risk of somnolence, limited ability to drive and/or work safely, and potential for dependence or overdose. It has also been made clear that these medications are for use by this patient only, without concomitant use of alcohol or other substances unless prescribed.   Patient has completed prescribing agreement detailing terms of continued prescribing of controlled substances, including monitoring KRISTA reports, urine drug screening, and pill counts if necessary. The patient is aware that inappropriate use will result in cessation of prescribing such medications.   History and physical exam  exhibit continued safe and appropriate use of controlled substances      Please note that portions of this note were completed with a voice recognition program.  Efforts were made to edit dictation, but occasionally words are mistranscribed.

## 2018-10-08 ENCOUNTER — PREP FOR SURGERY (OUTPATIENT)
Dept: OTHER | Facility: HOSPITAL | Age: 48
End: 2018-10-08

## 2018-10-08 DIAGNOSIS — Z12.11 SCREEN FOR COLON CANCER: Primary | ICD-10-CM

## 2018-10-19 ENCOUNTER — TELEPHONE (OUTPATIENT)
Dept: SURGERY | Facility: CLINIC | Age: 48
End: 2018-10-19

## 2018-10-26 ENCOUNTER — CLINICAL SUPPORT (OUTPATIENT)
Dept: OBSTETRICS AND GYNECOLOGY | Facility: CLINIC | Age: 48
End: 2018-10-26

## 2018-10-26 VITALS
BODY MASS INDEX: 27.49 KG/M2 | HEIGHT: 65 IN | SYSTOLIC BLOOD PRESSURE: 126 MMHG | DIASTOLIC BLOOD PRESSURE: 68 MMHG | WEIGHT: 165 LBS

## 2018-10-26 DIAGNOSIS — Z79.890 HORMONE REPLACEMENT THERAPY: ICD-10-CM

## 2018-10-26 PROCEDURE — 96372 THER/PROPH/DIAG INJ SC/IM: CPT | Performed by: OBSTETRICS & GYNECOLOGY

## 2018-11-06 RX ORDER — GUANFACINE 2 MG/1
2 TABLET ORAL NIGHTLY
Qty: 30 TABLET | Refills: 0 | Status: SHIPPED | OUTPATIENT
Start: 2018-11-06 | End: 2018-11-21 | Stop reason: SDUPTHER

## 2018-11-06 RX ORDER — DEXTROAMPHETAMINE SACCHARATE, AMPHETAMINE ASPARTATE MONOHYDRATE, DEXTROAMPHETAMINE SULFATE AND AMPHETAMINE SULFATE 6.25; 6.25; 6.25; 6.25 MG/1; MG/1; MG/1; MG/1
25 CAPSULE, EXTENDED RELEASE ORAL EVERY MORNING
Qty: 30 CAPSULE | Refills: 0 | Status: SHIPPED | OUTPATIENT
Start: 2018-11-06 | End: 2018-12-04 | Stop reason: SDUPTHER

## 2018-11-21 ENCOUNTER — FLU SHOT (OUTPATIENT)
Dept: INTERNAL MEDICINE | Facility: CLINIC | Age: 48
End: 2018-11-21

## 2018-11-21 DIAGNOSIS — Z23 NEED FOR INFLUENZA VACCINATION: Primary | ICD-10-CM

## 2018-11-21 LAB
ALBUMIN SERPL-MCNC: 4.4 G/DL (ref 3.5–5)
ALBUMIN/GLOB SERPL: 1.7 G/DL (ref 1–2)
ALP SERPL-CCNC: 74 U/L (ref 38–126)
ALT SERPL-CCNC: 25 U/L (ref 13–69)
AST SERPL-CCNC: 26 U/L (ref 15–46)
BILIRUB SERPL-MCNC: 0.4 MG/DL (ref 0.2–1.3)
BUN SERPL-MCNC: 13 MG/DL (ref 7–20)
BUN/CREAT SERPL: 21.7 (ref 7.1–23.5)
CALCIUM SERPL-MCNC: 9.4 MG/DL (ref 8.4–10.2)
CHLORIDE SERPL-SCNC: 107 MMOL/L (ref 98–107)
CHOLEST SERPL-MCNC: 179 MG/DL (ref 0–199)
CO2 SERPL-SCNC: 22 MMOL/L (ref 26–30)
CREAT SERPL-MCNC: 0.6 MG/DL (ref 0.6–1.3)
GLOBULIN SER CALC-MCNC: 2.6 GM/DL
GLUCOSE SERPL-MCNC: 99 MG/DL (ref 74–98)
HDLC SERPL-MCNC: 59 MG/DL (ref 40–60)
LDLC SERPL CALC-MCNC: 103 MG/DL (ref 0–99)
POTASSIUM SERPL-SCNC: 3.5 MMOL/L (ref 3.5–5.1)
PROT SERPL-MCNC: 7 G/DL (ref 6.3–8.2)
SODIUM SERPL-SCNC: 137 MMOL/L (ref 137–145)
TRIGL SERPL-MCNC: 86 MG/DL
VLDLC SERPL CALC-MCNC: 17.2 MG/DL

## 2018-11-21 PROCEDURE — G0008 ADMIN INFLUENZA VIRUS VAC: HCPCS | Performed by: INTERNAL MEDICINE

## 2018-11-21 PROCEDURE — 90674 CCIIV4 VAC NO PRSV 0.5 ML IM: CPT | Performed by: INTERNAL MEDICINE

## 2018-11-21 RX ORDER — MELOXICAM 15 MG/1
TABLET ORAL
Qty: 30 TABLET | Refills: 2 | Status: SHIPPED | OUTPATIENT
Start: 2018-11-21 | End: 2019-05-16 | Stop reason: SDUPTHER

## 2018-11-21 RX ORDER — GUANFACINE 2 MG/1
2 TABLET ORAL NIGHTLY
Qty: 30 TABLET | Refills: 2 | Status: SHIPPED | OUTPATIENT
Start: 2018-11-21 | End: 2019-03-06 | Stop reason: SDUPTHER

## 2018-12-04 RX ORDER — DEXTROAMPHETAMINE SACCHARATE, AMPHETAMINE ASPARTATE MONOHYDRATE, DEXTROAMPHETAMINE SULFATE AND AMPHETAMINE SULFATE 6.25; 6.25; 6.25; 6.25 MG/1; MG/1; MG/1; MG/1
25 CAPSULE, EXTENDED RELEASE ORAL EVERY MORNING
Qty: 30 CAPSULE | Refills: 0 | Status: SHIPPED | OUTPATIENT
Start: 2018-12-04 | End: 2019-01-11 | Stop reason: SDUPTHER

## 2018-12-06 ENCOUNTER — TELEPHONE (OUTPATIENT)
Dept: SURGERY | Facility: CLINIC | Age: 48
End: 2018-12-06

## 2018-12-07 ENCOUNTER — CLINICAL SUPPORT (OUTPATIENT)
Dept: OBSTETRICS AND GYNECOLOGY | Facility: CLINIC | Age: 48
End: 2018-12-07

## 2018-12-07 VITALS — HEIGHT: 65 IN | BODY MASS INDEX: 27.49 KG/M2 | WEIGHT: 165 LBS

## 2018-12-07 DIAGNOSIS — Z79.890 HORMONE REPLACEMENT THERAPY: ICD-10-CM

## 2018-12-07 PROCEDURE — 96372 THER/PROPH/DIAG INJ SC/IM: CPT | Performed by: OBSTETRICS & GYNECOLOGY

## 2018-12-10 ENCOUNTER — ANESTHESIA EVENT (OUTPATIENT)
Dept: GASTROENTEROLOGY | Facility: HOSPITAL | Age: 48
End: 2018-12-10

## 2018-12-10 ENCOUNTER — HOSPITAL ENCOUNTER (OUTPATIENT)
Facility: HOSPITAL | Age: 48
Setting detail: HOSPITAL OUTPATIENT SURGERY
Discharge: HOME | End: 2018-12-10
Attending: SURGERY | Admitting: SURGERY

## 2018-12-10 ENCOUNTER — ANESTHESIA (OUTPATIENT)
Dept: GASTROENTEROLOGY | Facility: HOSPITAL | Age: 48
End: 2018-12-10

## 2018-12-10 VITALS
HEIGHT: 65 IN | SYSTOLIC BLOOD PRESSURE: 113 MMHG | HEART RATE: 73 BPM | WEIGHT: 165 LBS | OXYGEN SATURATION: 100 % | RESPIRATION RATE: 18 BRPM | TEMPERATURE: 97.6 F | BODY MASS INDEX: 27.49 KG/M2 | DIASTOLIC BLOOD PRESSURE: 69 MMHG

## 2018-12-10 PROCEDURE — 25010000002 ONDANSETRON PER 1 MG: Performed by: NURSE ANESTHETIST, CERTIFIED REGISTERED

## 2018-12-10 PROCEDURE — S0260 H&P FOR SURGERY: HCPCS | Performed by: SURGERY

## 2018-12-10 PROCEDURE — 25010000002 PROPOFOL 200 MG/20ML EMULSION: Performed by: NURSE ANESTHETIST, CERTIFIED REGISTERED

## 2018-12-10 PROCEDURE — 25010000002 HYDROMORPHONE PER 4 MG: Performed by: NURSE ANESTHETIST, CERTIFIED REGISTERED

## 2018-12-10 RX ORDER — SODIUM CHLORIDE, SODIUM LACTATE, POTASSIUM CHLORIDE, CALCIUM CHLORIDE 600; 310; 30; 20 MG/100ML; MG/100ML; MG/100ML; MG/100ML
1000 INJECTION, SOLUTION INTRAVENOUS CONTINUOUS
Status: DISCONTINUED | OUTPATIENT
Start: 2018-12-10 | End: 2018-12-10 | Stop reason: HOSPADM

## 2018-12-10 RX ORDER — ONDANSETRON 2 MG/ML
INJECTION INTRAMUSCULAR; INTRAVENOUS AS NEEDED
Status: DISCONTINUED | OUTPATIENT
Start: 2018-12-10 | End: 2018-12-10 | Stop reason: SURG

## 2018-12-10 RX ORDER — SODIUM CHLORIDE 0.9 % (FLUSH) 0.9 %
3 SYRINGE (ML) INJECTION AS NEEDED
Status: DISCONTINUED | OUTPATIENT
Start: 2018-12-10 | End: 2018-12-10 | Stop reason: HOSPADM

## 2018-12-10 RX ORDER — PROPOFOL 10 MG/ML
INJECTION, EMULSION INTRAVENOUS AS NEEDED
Status: DISCONTINUED | OUTPATIENT
Start: 2018-12-10 | End: 2018-12-10 | Stop reason: SURG

## 2018-12-10 RX ORDER — HYDROMORPHONE HCL 110MG/55ML
PATIENT CONTROLLED ANALGESIA SYRINGE INTRAVENOUS AS NEEDED
Status: DISCONTINUED | OUTPATIENT
Start: 2018-12-10 | End: 2018-12-10 | Stop reason: SURG

## 2018-12-10 RX ORDER — MAGNESIUM HYDROXIDE 1200 MG/15ML
LIQUID ORAL AS NEEDED
Status: DISCONTINUED | OUTPATIENT
Start: 2018-12-10 | End: 2018-12-10 | Stop reason: HOSPADM

## 2018-12-10 RX ADMIN — PROPOFOL 50 MG: 10 INJECTION, EMULSION INTRAVENOUS at 13:21

## 2018-12-10 RX ADMIN — ONDANSETRON 4 MG: 2 INJECTION INTRAMUSCULAR; INTRAVENOUS at 13:33

## 2018-12-10 RX ADMIN — PROPOFOL 50 MG: 10 INJECTION, EMULSION INTRAVENOUS at 13:17

## 2018-12-10 RX ADMIN — PROPOFOL 50 MG: 10 INJECTION, EMULSION INTRAVENOUS at 13:27

## 2018-12-10 RX ADMIN — SODIUM CHLORIDE, POTASSIUM CHLORIDE, SODIUM LACTATE AND CALCIUM CHLORIDE 1000 ML: 600; 310; 30; 20 INJECTION, SOLUTION INTRAVENOUS at 12:01

## 2018-12-10 RX ADMIN — HYDROMORPHONE HYDROCHLORIDE 0.5 MG: 2 INJECTION, SOLUTION INTRAMUSCULAR; INTRAVENOUS; SUBCUTANEOUS at 13:07

## 2018-12-10 RX ADMIN — PROPOFOL 100 MG: 10 INJECTION, EMULSION INTRAVENOUS at 13:11

## 2018-12-10 RX ADMIN — LIDOCAINE HYDROCHLORIDE 60 MG: 20 INJECTION, SOLUTION INTRAVENOUS at 13:11
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
## 2018-12-10 NOTE — H&P
Reason for Consultation:  Screening colonoscopy    Chief complaint :  Screening colonoscopy    Subjective .     History of present illness:  I did see the patient today as a consultation for evaluation and treatment of a need for screening colonoscopy.  There are no other complaints of.    Review of Systems:    Review of Systems - General ROS: negative for - chills, fatigue, fever, hot flashes, malaise or night sweats  Psychological ROS: negative for - behavioral disorder, disorientation, hallucinations, hostility or mood swings  ENT ROS: negative for - nasal polyps, oral lesions, sinus pain, sneezing or sore throat  Breast ROS: negative for - galactorrhea or new or changing breast lumps  Respiratory ROS: negative for - hemoptysis, orthopnea, pleuritic pain, sputum changes or stridor  Cardiovascular ROS: negative for - dyspnea on exertion, edema, irregular heartbeat, murmur, orthopnea, palpitations or rapid heart rate  Gastrointestinal ROS: negative for - change in stools, gas/bloating, hematemesis, melena or stool incontinence.  Genito-Urinary ROS: negative for - dysuria, genital ulcers, nocturia or pelvic pain  Musculoskeletal ROS: negative for - gait disturbance or muscle pain  Neurological ROS: negative for - dizziness, gait disturbance, memory loss, numbness/tingling or seizures      Objective     Vital Signs   Temp:  [98.6 °F (37 °C)] 98.6 °F (37 °C)  Heart Rate:  [108] 108  Resp:  [16] 16  BP: (134)/(84) 134/84    Physical Exam:     General Appearance:    Alert, cooperative, in no acute distress   Head:    Normocephalic, without obvious abnormality, atraumatic   Eyes:            Lids and lashes normal, conjunctivae and sclerae normal, no   icterus, no pallor, corneas clear, PERRLA   Ears:    Ears appear intact with no abnormalities noted   Throat:   No oral lesions, no thrush, oral mucosa moist   Neck:   No adenopathy, supple, trachea midline, no thyromegaly, no   carotid bruit, no JVD   Back:     No  kyphosis present, no scoliosis present, no skin lesions,      erythema or scars, no tenderness to percussion or                   palpation,   range of motion normal   Lungs:     Clear to auscultation,respirations regular, even and                  unlabored    Heart:    Regular rhythm and normal rate, normal S1 and S2, no            murmur, no gallop, no rub, no click   Chest Wall:    No abnormalities observed   Abdomen:     Normal bowel sounds, no masses, no organomegaly, soft        non-tender, non-distended, no guarding, there is no evidence of tenderness   Extremities:   Moves all extremities well, no edema, no cyanosis, no             redness   Pulses:   Pulses palpable and equal bilaterally   Skin:   No bleeding, bruising or rash   Lymph nodes:   No palpable adenopathy   Neurologic:   Cranial nerves 2 - 12 grossly intact, sensation intact, DTR       present and equal bilaterally           Assessment/Plan     Screening colonoscopy      I did have a detailed and extensive discussion with the patient in the office today.  The full risks and benefits of operative versus nonoperative intervention were discussed with the paient, they understand, agree, and wish to proceed with the surgical treatment plan of colonoscopy.      Piter Ramachandran MD

## 2018-12-10 NOTE — ANESTHESIA POSTPROCEDURE EVALUATION
Patient: Jazmyn Sinclair    Procedure Summary     Date:  12/10/18 Room / Location:  [facility] ENDOSCOPY 3 / [facility] ENDOSCOPY    Anesthesia Start:  1303 Anesthesia Stop:  1337    Procedure:  COLONOSCOPY (N/A ) Diagnosis:       Screen for colon cancer      (Screen for colon cancer [Z12.11])    Surgeon:  Piter Ramachandran MD Provider:  Cici Parisi CRNA    Anesthesia Type:  MAC ASA Status:  2          Anesthesia Type: MAC  Last vitals  BP   113/69 (12/10/18 1408)   Temp   97.6 °F (36.4 °C) (12/10/18 1338)   Pulse   73 (12/10/18 1408)   Resp   18 (12/10/18 1408)     SpO2   100 % (12/10/18 1408)     Post Anesthesia Care and Evaluation    Patient location during evaluation: PHASE II  Patient participation: complete - patient participated  Level of consciousness: awake and alert  Pain score: 0  Pain management: satisfactory to patient  Airway patency: patent  Anesthetic complications: No anesthetic complications  PONV Status: none  Cardiovascular status: acceptable and stable  Respiratory status: acceptable  Hydration status: acceptable

## 2019-01-11 RX ORDER — DEXTROAMPHETAMINE SACCHARATE, AMPHETAMINE ASPARTATE MONOHYDRATE, DEXTROAMPHETAMINE SULFATE AND AMPHETAMINE SULFATE 6.25; 6.25; 6.25; 6.25 MG/1; MG/1; MG/1; MG/1
25 CAPSULE, EXTENDED RELEASE ORAL EVERY MORNING
Qty: 30 CAPSULE | Refills: 0 | Status: SHIPPED | OUTPATIENT
Start: 2019-01-11 | End: 2019-02-11 | Stop reason: SDUPTHER

## 2019-01-11 NOTE — TELEPHONE ENCOUNTER
Regarding: Prescription Question  Contact: [phone]  ----- Message from Slyce, Generic sent at 1/10/2019  5:04 PM EST -----    I need a refill on Adderall XR. This is my third attempt at using this message board to get a refill this month... So I am praying it works since I am out of Medicine!   Please advise as soon as is practicable. Thank you for your assistance on this matter.    Jazmyn Sinclair  [phone]

## 2019-01-15 ENCOUNTER — CLINICAL SUPPORT (OUTPATIENT)
Dept: OBSTETRICS AND GYNECOLOGY | Facility: CLINIC | Age: 49
End: 2019-01-15

## 2019-01-15 VITALS
HEIGHT: 65 IN | BODY MASS INDEX: 27.66 KG/M2 | DIASTOLIC BLOOD PRESSURE: 64 MMHG | SYSTOLIC BLOOD PRESSURE: 126 MMHG | WEIGHT: 166 LBS

## 2019-01-15 DIAGNOSIS — Z79.890 HORMONE REPLACEMENT THERAPY (HRT): ICD-10-CM

## 2019-01-15 PROCEDURE — 96372 THER/PROPH/DIAG INJ SC/IM: CPT | Performed by: OBSTETRICS & GYNECOLOGY

## 2019-01-15 RX ORDER — VENLAFAXINE HYDROCHLORIDE 75 MG/1
225 CAPSULE, EXTENDED RELEASE ORAL DAILY
Refills: 1 | COMMUNITY
Start: 2019-01-10 | End: 2019-02-13 | Stop reason: SDUPTHER

## 2019-02-11 RX ORDER — DEXTROAMPHETAMINE SACCHARATE, AMPHETAMINE ASPARTATE MONOHYDRATE, DEXTROAMPHETAMINE SULFATE AND AMPHETAMINE SULFATE 6.25; 6.25; 6.25; 6.25 MG/1; MG/1; MG/1; MG/1
25 CAPSULE, EXTENDED RELEASE ORAL EVERY MORNING
Qty: 30 CAPSULE | Refills: 0 | Status: SHIPPED | OUTPATIENT
Start: 2019-02-11 | End: 2019-03-12 | Stop reason: SDUPTHER

## 2019-02-11 NOTE — TELEPHONE ENCOUNTER
Regarding: FW: RE: RE: Adderall XR Prescription REFILL  Contact: [phone]  Please check to see if it is time for her Adderall.    ----- Message -----  From: Erin Gomez CMA  Sent: 2/11/2019   2:21 PM  To: Marilyn K Vermeesch, MD  Subject: FW: RE: RE: Adderall XR Prescription REFILL          ----- Message -----  From: Jazmyn Sinclair  Sent: 2/10/2019   2:04 PM  To: Emigdio Oconnor Simon  [facility] [place]  Subject: RE: RE: RE: Adderall XR Prescription REFILL      ----- Message from Mychart, Generic sent at 2/10/2019  2:04 PM EST -----    Nicolas Weiss.   It's that time agn... I'm due & need a refill on my Adderall. Please, & tyvm :-)  Jazmyn    ----- Message -----  From: EJ GEORGES  Sent: 1/11/19 10:03 AM  To: Jazmyn Sinclair  Subject: RE: RE: Adderall XR Prescription REFILL    Pharmacy has been updated.          ----- Message -----     From: Jazmyn Sinclair     Sent: 1/11/2019  9:05 AM EST       To: Marilyn K. Vermeesch, MD  Subject: RE: RE: Adderall XR Prescription REFILL    Thank you for your speedy assistance. Additionally I forgot to mention that I have switched pharmacies, I'm now using [facility] on [address], phone [phone]. Can you make the change without interruption of this prescription?    ----- Message -----  From: EJ GEORGES  Sent: 1/11/19 8:43 AM  To: Jazmyn Sinclair  Subject: RE: Adderall XR Prescription REFILL    Jazmyn,    I apologize but this is the first request we have received for a refill of your Adderall XR. I've pended the script for Dr. Vermeesch to send in, it will be available at your pharmacy later this evening.           ----- Message -----     From: Jazmyn Sinclair     Sent: 1/10/2019  4:56 PM EST       To: Marilyn K. Vermeesch, MD  Subject: RE: Adderall XR Prescription REFILL    Hi. I requested a refill last week (on the 3rd),  &  I still have yet to receive a response (today is the 10th.) I am now OUT of medicine & am again requesting a refill on my  Adderall XR.   Please advise as soon as is practicable. Thank you for your assistance.    Jazmyn Sinclair  [phone]      ----- Message -----  From: EJ GEORGES  Sent: 9/6/18 9:11 AM  To: Jazmyn Sinclair  Subject: RE: Prescription Question    I've pended the script for Dr. Vermeesch to send in today.          ----- Message -----     From: Jazmyn Sinclair     Sent: 9/5/2018  8:22 PM EDT       To: Marilyn K. Vermeesch, MD  Subject: Prescription Question    this is my first time trying to use this for a refill, so if I do it incorrectly, please let me know and advise terry I'm new at this.   I am in need of a refill on my Adderall XR.   Please advise.  Thank you,  Jazmyn Sinclair

## 2019-02-12 RX ORDER — OMEPRAZOLE 20 MG/1
CAPSULE, DELAYED RELEASE ORAL
Qty: 30 CAPSULE | Refills: 2 | Status: SHIPPED | OUTPATIENT
Start: 2019-02-12 | End: 2019-05-04 | Stop reason: SDUPTHER

## 2019-02-13 RX ORDER — VENLAFAXINE HYDROCHLORIDE 75 MG/1
225 CAPSULE, EXTENDED RELEASE ORAL DAILY
Qty: 90 CAPSULE | Refills: 0 | Status: SHIPPED | OUTPATIENT
Start: 2019-02-13 | End: 2019-08-14 | Stop reason: SDUPTHER

## 2019-02-22 ENCOUNTER — CLINICAL SUPPORT (OUTPATIENT)
Dept: OBSTETRICS AND GYNECOLOGY | Facility: CLINIC | Age: 49
End: 2019-02-22

## 2019-02-22 VITALS — BODY MASS INDEX: 26.33 KG/M2 | HEIGHT: 65 IN | WEIGHT: 158 LBS

## 2019-02-22 DIAGNOSIS — Z79.890 HORMONE REPLACEMENT THERAPY: ICD-10-CM

## 2019-02-22 PROCEDURE — 96372 THER/PROPH/DIAG INJ SC/IM: CPT | Performed by: OBSTETRICS & GYNECOLOGY

## 2019-03-06 RX ORDER — GUANFACINE 2 MG/1
TABLET ORAL
Qty: 30 TABLET | Refills: 2 | Status: SHIPPED | OUTPATIENT
Start: 2019-03-06 | End: 2019-09-04 | Stop reason: SDUPTHER

## 2019-03-12 RX ORDER — DEXTROAMPHETAMINE SACCHARATE, AMPHETAMINE ASPARTATE MONOHYDRATE, DEXTROAMPHETAMINE SULFATE AND AMPHETAMINE SULFATE 6.25; 6.25; 6.25; 6.25 MG/1; MG/1; MG/1; MG/1
25 CAPSULE, EXTENDED RELEASE ORAL EVERY MORNING
Qty: 30 CAPSULE | Refills: 0 | Status: SHIPPED | OUTPATIENT
Start: 2019-03-12 | End: 2019-04-18 | Stop reason: SDUPTHER

## 2019-03-12 NOTE — TELEPHONE ENCOUNTER
Regarding: RE: RE RE: Adderall XR Prescription REFILL  Contact: [phone]  ----- Message from Lindsey, Generic sent at 3/11/2019  9:08 PM EDT -----    Hi Lindsay.   It's that time agn... I'm due & need a refill on my Adderall. Please, & tyvm :-)  Jazmyn    ----- Message -----  From: EJ GEORGES  Sent: 2/11/19 5:13 PM  To: Jazmyn Sinclair  Subject: RE: RE: RE: Adderall XR Prescription REFILL    I've pended your script for Dr. Vermeesch to approve.           ----- Message -----     From: Jazmyn Sinclair     Sent: 2/10/2019  2:04 PM EST       To: Marilyn K. Vermeesch, MD  Subject: RE: RE: RE: Adderall XR Prescription REFILL    Hi Isela.   It's that time agn... I'm due & need a refill on my Adderall. Please, & tyvm :-)  Jazmyn    ----- Message -----  From: EJ GEORGES  Sent: 1/11/19 10:03 AM  To: Jazmyn Sinclair  Subject: RE: RE: Adderall XR Prescription REFILL    Pharmacy has been updated.          ----- Message -----     From: Jazmyn Sinclair     Sent: 1/11/2019  9:05 AM EST       To: Marilyn K. Vermeesch, MD  Subject: RE: RE: Adderall XR Prescription REFILL    Thank you for your speedy assistance. Additionally I forgot to mention that I have switched pharmacies, I'm now using [facility] on [address], phone [phone]. Can you make the change without interruption of this prescription?    ----- Message -----  From: EJ GEORGES  Sent: 1/11/19 8:43 AM  To: Jazmyn Sinclair  Subject: RE: Adderall XR Prescription REFILL    Jazmyn,    I apologize but this is the first request we have received for a refill of your Adderall XR. I've pended the script for Dr. Vermeesch to send in, it will be available at your pharmacy later this evening.           ----- Message -----     From: Jazmyn Sinclair     Sent: 1/10/2019  4:56 PM EST       To: Marilyn K. Vermeesch, MD  Subject: RE: Adderall XR Prescription REFILL    Hi. I requested a refill last week (on the 3rd),  &  I still have yet to receive a  response (today is the 10th.) I am now OUT of medicine & am again requesting a refill on my Adderall XR.   Please advise as soon as is practicable. Thank you for your assistance.    Jazmyn Sinclair  [phone]      ----- Message -----  From: EJ GEORGES  Sent: 9/6/18 9:11 AM  To: Jazmyn Sinclair  Subject: RE: Prescription Question    I've pended the script for Dr. Vermeesch to send in today.          ----- Message -----     From: Jazmyn Sinclair     Sent: 9/5/2018  8:22 PM EDT       To: Marilyn K. Vermeesch, MD  Subject: Prescription Question    this is my first time trying to use this for a refill, so if I do it incorrectly, please let me know and advise terry I'm new at this.   I am in need of a refill on my Adderall XR.   Please advise.  Thank you,  Jazmyn Sinclair

## 2019-04-18 RX ORDER — DEXTROAMPHETAMINE SACCHARATE, AMPHETAMINE ASPARTATE MONOHYDRATE, DEXTROAMPHETAMINE SULFATE AND AMPHETAMINE SULFATE 6.25; 6.25; 6.25; 6.25 MG/1; MG/1; MG/1; MG/1
25 CAPSULE, EXTENDED RELEASE ORAL EVERY MORNING
Qty: 30 CAPSULE | Refills: 0 | Status: SHIPPED | OUTPATIENT
Start: 2019-04-18 | End: 2019-05-13 | Stop reason: SDUPTHER

## 2019-05-06 RX ORDER — OMEPRAZOLE 20 MG/1
CAPSULE, DELAYED RELEASE ORAL
Qty: 30 CAPSULE | Refills: 2 | Status: SHIPPED | OUTPATIENT
Start: 2019-05-06 | End: 2019-09-04 | Stop reason: SDUPTHER

## 2019-05-13 ENCOUNTER — OFFICE VISIT (OUTPATIENT)
Dept: INTERNAL MEDICINE | Facility: CLINIC | Age: 49
End: 2019-05-13

## 2019-05-13 VITALS
HEIGHT: 64 IN | WEIGHT: 167 LBS | TEMPERATURE: 98.8 F | OXYGEN SATURATION: 96 % | RESPIRATION RATE: 17 BRPM | DIASTOLIC BLOOD PRESSURE: 82 MMHG | HEART RATE: 81 BPM | BODY MASS INDEX: 28.51 KG/M2 | SYSTOLIC BLOOD PRESSURE: 132 MMHG

## 2019-05-13 DIAGNOSIS — K21.9 GASTROESOPHAGEAL REFLUX DISEASE WITHOUT ESOPHAGITIS: Primary | ICD-10-CM

## 2019-05-13 DIAGNOSIS — F32.A ANXIETY AND DEPRESSION: ICD-10-CM

## 2019-05-13 DIAGNOSIS — F90.2 ATTENTION DEFICIT HYPERACTIVITY DISORDER (ADHD), COMBINED TYPE: ICD-10-CM

## 2019-05-13 DIAGNOSIS — F41.9 ANXIETY AND DEPRESSION: ICD-10-CM

## 2019-05-13 PROBLEM — J01.00 SUBACUTE MAXILLARY SINUSITIS: Status: RESOLVED | Noted: 2018-10-05 | Resolved: 2019-05-13

## 2019-05-13 PROCEDURE — 99214 OFFICE O/P EST MOD 30 MIN: CPT | Performed by: INTERNAL MEDICINE

## 2019-05-13 RX ORDER — OXYCODONE AND ACETAMINOPHEN 10; 325 MG/1; MG/1
1 TABLET ORAL EVERY 6 HOURS
Refills: 0 | COMMUNITY
Start: 2019-05-10

## 2019-05-13 RX ORDER — FLUTICASONE PROPIONATE 50 MCG
2 SPRAY, SUSPENSION (ML) NASAL DAILY
Qty: 1 BOTTLE | Refills: 5 | Status: SHIPPED | OUTPATIENT
Start: 2019-05-13 | End: 2019-12-23 | Stop reason: SDUPTHER

## 2019-05-13 RX ORDER — SUMATRIPTAN 100 MG/1
TABLET, FILM COATED ORAL
Refills: 1 | COMMUNITY
Start: 2019-05-10

## 2019-05-13 RX ORDER — DEXTROAMPHETAMINE SACCHARATE, AMPHETAMINE ASPARTATE MONOHYDRATE, DEXTROAMPHETAMINE SULFATE AND AMPHETAMINE SULFATE 6.25; 6.25; 6.25; 6.25 MG/1; MG/1; MG/1; MG/1
25 CAPSULE, EXTENDED RELEASE ORAL EVERY MORNING
Qty: 30 CAPSULE | Refills: 0 | Status: SHIPPED | OUTPATIENT
Start: 2019-05-13 | End: 2019-06-17 | Stop reason: SDUPTHER

## 2019-05-13 RX ORDER — TOPIRAMATE 25 MG/1
75 TABLET ORAL DAILY
Refills: 1 | COMMUNITY
Start: 2019-04-11 | End: 2019-08-01

## 2019-05-13 RX ORDER — TIZANIDINE 4 MG/1
8 TABLET ORAL
Refills: 1 | COMMUNITY
Start: 2019-04-11

## 2019-05-13 NOTE — PROGRESS NOTES
Chief Complaint   Patient presents with   • Follow-up     GERD, Anxiety and depression.     Subjective   Jazmyn Sinclair is a 48 y.o. female.        Here for follow up of anxiety and depression, gastroesophageal reflux disease, migraine headaches, anemia, chronic back pain status post multiple surgeries, ADHD.  Anxiety/depression- currently on effexor and she is stable with good control of depression and anxiety.  GERD- this is well controlled most days, but she is taking it with food  Migraines- she is taking imitrex as needed.  She remains on topamax and has migraines about 1-2 times a week.  She was not having any HAs until she weaned off HRT recently  Chronic pain- managed per Dr Moreland.    ADHD- she remains on adderall. She was diagnosed by psychiatrist with ADD many yrs ago and has been on this med since then   She is weaning off HRT per GYN.  She is not having many hot flashes.    Allergies- she is using flonase and zyrtec.  Since starting flonase, has no sxs  HCM- it is time for mammogram in June.  She needs Hep A vaccine and shingrix.  She had colonoscopy last yr and was told next one is 10 yrs.          The following portions of the patient's history were reviewed and updated as appropriate: allergies, current medications, past family history, past medical history, past social history, past surgical history and problem list.    Review of Systems   Constitutional: Negative for activity change, appetite change and unexpected weight change.   HENT: Positive for postnasal drip.    Eyes: Negative for visual disturbance.   Respiratory: Negative for shortness of breath.    Cardiovascular: Negative for chest pain, palpitations and leg swelling.   Gastrointestinal: Negative for abdominal pain.   Musculoskeletal: Positive for arthralgias.   Allergic/Immunologic: Positive for environmental allergies.   Neurological: Positive for headaches.   Psychiatric/Behavioral: Positive for decreased concentration. Negative for  "dysphoric mood and sleep disturbance. The patient is not nervous/anxious.    All other systems reviewed and are negative.      Objective   /82   Pulse 81   Temp 98.8 °F (37.1 °C)   Resp 17   Ht 163.8 cm (64.49\")   Wt 75.8 kg (167 lb)   LMP  (LMP Unknown)   SpO2 96%   BMI 28.23 kg/m²   Body mass index is 28.23 kg/m².  Physical Exam   Constitutional: She is oriented to person, place, and time. She appears well-developed and well-nourished. No distress.   Pleasant female, NAD, talkative today   HENT:   Head: Normocephalic and atraumatic.   Right Ear: External ear normal.   Left Ear: External ear normal.   Mouth/Throat: Oropharynx is clear and moist.   Clear PND noted   Eyes: Conjunctivae and EOM are normal. Pupils are equal, round, and reactive to light.   Neck: Normal range of motion. Neck supple. No thyromegaly present.   Cardiovascular: Normal rate, regular rhythm, normal heart sounds and intact distal pulses.   No murmur heard.  No carotid bruits   Pulmonary/Chest: Effort normal and breath sounds normal. No respiratory distress. She has no wheezes.   Abdominal: Soft. Bowel sounds are normal. She exhibits no distension. There is no tenderness.   Musculoskeletal: Normal range of motion. She exhibits no edema.   Back pain noted with change in position from lying to sitting   Lymphadenopathy:     She has no cervical adenopathy.   Neurological: She is alert and oriented to person, place, and time. No cranial nerve deficit. Coordination normal.   Psychiatric: She has a normal mood and affect. Her behavior is normal. Judgment and thought content normal.   Nursing note and vitals reviewed.      Assessment/Plan   Jazmyn Sinclair is here today and the following problems have been addressed:      Jazmyn was seen today for follow-up.    Diagnoses and all orders for this visit:    Gastroesophageal reflux disease without esophagitis    Anxiety and depression    Attention deficit hyperactivity disorder (ADHD), " combined type    Other orders  -     fluticasone (VERAMYST) 27.5 MCG/SPRAY nasal spray; 2 sprays into the nostril(s) as directed by provider Daily.    encouraged her to take omeprazole on empty stomach for better control of GERD  Effexor is working well for depression and anxiety  RF given on adderall today- no signs of abuse, she does not call early for this medication  Hep A #1 given today  Encouraged her to arrange her mammogram for next mo  Encouraged her to get shingrix vaccine  Labs due next visit    RTC 6 mo for medicare wellness    Please note that portions of this note were completed with a voice recognition program.  Efforts were made to edit dictation, but occasionally words are mistranscribed.

## 2019-05-16 RX ORDER — MELOXICAM 15 MG/1
TABLET ORAL
Qty: 30 TABLET | Refills: 2 | Status: SHIPPED | OUTPATIENT
Start: 2019-05-16 | End: 2019-09-04 | Stop reason: SDUPTHER

## 2019-06-08 RX ORDER — POTASSIUM CHLORIDE 750 MG/1
CAPSULE, EXTENDED RELEASE ORAL
Qty: 60 CAPSULE | Refills: 1 | Status: SHIPPED | OUTPATIENT
Start: 2019-06-08 | End: 2019-08-14 | Stop reason: SDUPTHER

## 2019-06-17 RX ORDER — DEXTROAMPHETAMINE SACCHARATE, AMPHETAMINE ASPARTATE MONOHYDRATE, DEXTROAMPHETAMINE SULFATE AND AMPHETAMINE SULFATE 6.25; 6.25; 6.25; 6.25 MG/1; MG/1; MG/1; MG/1
25 CAPSULE, EXTENDED RELEASE ORAL EVERY MORNING
Qty: 30 CAPSULE | Refills: 0 | Status: SHIPPED | OUTPATIENT
Start: 2019-06-17 | End: 2019-07-15 | Stop reason: SDUPTHER

## 2019-06-17 NOTE — TELEPHONE ENCOUNTER
Regarding: RE: RE:  Adderall XR Prescription REFILL  Contact: [phone]  ----- Message from Mychart, Generic sent at 6/15/2019  9:25 AM EDT -----    Hi Isela.   It's that time agn... Monday I'm due & need a refill on my Adderall. Please, & tyvm :-)    Jazmyn Durham    ----- Message -----  From: EJ GEORGES  Sent: 4/18/19 10:47 AM  To: Jazmyn Sinclair  Subject: RE:  Adderall XR Prescription REFILL    I've pended your script for Dr. Vermeesch to send in. It will be available at your pharmacy later today.            ----- Message -----     From: Jazmyn Sinclair     Sent: 4/17/2019 11:02 AM EDT       To: Marilyn K. Vermeesch, MD  Subject:  Adderall XR Prescription REFILL    Hi [place].   It's that time agn... I'm due & need a refill on my Adderall. Please, & tyvm :-)  Jazmyn Durham    ----- Message -----  From: EJ GEORGES  Sent: 3/12/19 8:58 AM  To: Jazmyn Sinclair  Subject: RE: RE RE: Adderall XR Prescription REFILL    I've pended your script for Dr. Vermeesch to send in. It will be available at your pharmacy later today.        ----- Message -----     From: Jazmyn Sinclair     Sent: 3/11/2019  9:08 PM EDT       To: Marilyn K. Vermeesch, MD  Subject: RE: RE RE: Adderall XR Prescription REFILL    Hi Isela.   It's that time agn... I'm due & need a refill on my Adderall. Please, & tyvm :-)  Jazmyn    ----- Message -----  From: EJ GEORGES  Sent: 2/11/19 5:13 PM  To: Jazmyn Sinclair  Subject: RE: RE: RE: Adderall XR Prescription REFILL    I've pended your script for Dr. Vermeesch to approve.           ----- Message -----     From: Jazmyn Sinclair     Sent: 2/10/2019  2:04 PM EST       To: Marilyn K. Vermeesch, MD  Subject: RE: RE: RE: Adderall XR Prescription REFILL    Hi [place].   It's that time agn... I'm due & need a refill on my Adderall. Please, & tyvm :-)  Jazmyn    ----- Message -----  From: EJ GEORGES  Sent: 1/11/19 10:03 AM  To: Jazmyn Sinclair  Subject: RE: RE: Adderall XR  Prescription REFILL    Pharmacy has been updated.          ----- Message -----     From: Jazmyn Sinclair     Sent: 1/11/2019  9:05 AM EST       To: Marilyn K. Vermeesch, MD  Subject: RE: RE: Adderall XR Prescription REFILL    Thank you for your speedy assistance. Additionally I forgot to mention that I have switched pharmacies, I'm now using [facility] on [address], phone [phone]. Can you make the change without interruption of this prescription?    ----- Message -----  From: EJ GEORGES  Sent: 1/11/19 8:43 AM  To: Jazmyn Sinclair  Subject: RE: Adderall XR Prescription REFILL    Jazmyn,    I apologize but this is the first request we have received for a refill of your Adderall XR. I've pended the script for Dr. Vermeesch to send in, it will be available at your pharmacy later this evening.           ----- Message -----     From: Jazmyn Sinclair     Sent: 1/10/2019  4:56 PM EST       To: Marilyn K. Vermeesch, MD  Subject: RE: Adderall XR Prescription REFILL    Hi. I requested a refill last week (on the 3rd),  &  I still have yet to receive a response (today is the 10th.) I am now OUT of medicine & am again requesting a refill on my Adderall XR.   Please advise as soon as is practicable. Thank you for your assistance.    Jazmyn Sinclair  [phone]      ----- Message -----  From: EJ GEORGES  Sent: 9/6/18 9:11 AM  To: Jazmyn Sinclair  Subject: RE: Prescription Question    I've pended the script for Dr. Vermeesch to send in today.          ----- Message -----     From: Jazmyn Sinclair     Sent: 9/5/2018  8:22 PM EDT       To: Marilyn K. Vermeesch, MD  Subject: Prescription Question    this is my first time trying to use this for a refill, so if I do it incorrectly, please let me know and advise terry I'm new at this.   I am in need of a refill on my Adderall XR.   Please advise.  Thank you,  Jazmyn Sinclair

## 2019-07-15 RX ORDER — DEXTROAMPHETAMINE SACCHARATE, AMPHETAMINE ASPARTATE MONOHYDRATE, DEXTROAMPHETAMINE SULFATE AND AMPHETAMINE SULFATE 6.25; 6.25; 6.25; 6.25 MG/1; MG/1; MG/1; MG/1
25 CAPSULE, EXTENDED RELEASE ORAL EVERY MORNING
Qty: 30 CAPSULE | Refills: 0 | Status: SHIPPED | OUTPATIENT
Start: 2019-07-15 | End: 2019-08-13 | Stop reason: SDUPTHER

## 2019-07-15 NOTE — TELEPHONE ENCOUNTER
Regarding: RE: RE: RE:  Adderall XR Prescription REFILL  Contact: [phone]  ----- Message from Mychart, Generic sent at 7/15/2019 10:31 AM EDT -----    Hi [place].   It's that time agn... Monday I'm due & need a refill on my Adderall. Please, & tyvm :-)    Jazmyn Brooks    ----- Message -----  From: EJ GEORGES  Sent: 6/17/19 10:50 AM  To: Jamzyn Sinclair  Subject: RE: RE:  Adderall XR Prescription REFILL    Bronsondemetri Eldridge,    I've pended the script to Dr. Vermeesch for approval.          ----- Message -----     From: Jazmyn Sinclair     Sent: 6/15/2019  9:25 AM EDT       To: Marilyn K. Vermeesch, MD  Subject: RE: RE:  Adderall XR Prescription REFILL    Hi Isela.   It's that time agn... Monday I'm due & need a refill on my Adderall. Please, & tyvm :-)    Jazmyn Durham    ----- Message -----  From: EJ GEORGES  Sent: 4/18/19 10:47 AM  To: Jazmyn Sinclair  Subject: RE:  Adderall XR Prescription REFILL    I've pended your script for Dr. Vermeesch to send in. It will be available at your pharmacy later today.            ----- Message -----     From: Jazmyn Sinclair     Sent: 4/17/2019 11:02 AM EDT       To: Marilyn K. Vermeesch, MD  Subject:  Adderall XR Prescription REFILL    Hi Isela.   It's that time agn... I'm due & need a refill on my Adderall. Please, & tyvm :-)  Jazmyn Durham    ----- Message -----  From: EJ GEORGES  Sent: 3/12/19 8:58 AM  To: Jazmyn Sinclair  Subject: RE: RE RE: Adderall XR Prescription REFILL    I've pended your script for Dr. Vermeesch to send in. It will be available at your pharmacy later today.        ----- Message -----     From: Jazmyn Sinclair     Sent: 3/11/2019  9:08 PM EDT       To: Marilyn K. Vermeesch, MD  Subject: RE: RE RE: Adderall XR Prescription REFILL    Hi [place].   It's that time agn... I'm due & need a refill on my Adderall. Please, & tyvm :-)  Jazmyn    ----- Message -----  From: EJ GEORGES  Sent: 2/11/19 5:13 PM  To: Jazmyn PETERSON  Dottie  Subject: RE: RE: RE: Adderall XR Prescription REFILL    I've pended your script for Dr. Vermeesch to approve.           ----- Message -----     From: Jazmyn Sinclair     Sent: 2/10/2019  2:04 PM EST       To: Marilyn K. Vermeesch, MD  Subject: RE: RE: RE: Adderall XR Prescription REFILL    Hi [place].   It's that time agn... I'm due & need a refill on my Adderall. Please, & tyvm :-)  Jazmyn    ----- Message -----  From: EJ GEORGES  Sent: 1/11/19 10:03 AM  To: Jazmyn Sinclair  Subject: RE: RE: Adderall XR Prescription REFILL    Pharmacy has been updated.          ----- Message -----     From: Jazmyn Sinclair     Sent: 1/11/2019  9:05 AM EST       To: Marilyn K. Vermeesch, MD  Subject: RE: RE: Adderall XR Prescription REFILL    Thank you for your speedy assistance. Additionally I forgot to mention that I have switched pharmacies, I'm now using [facility] on [address], phone [phone]. Can you make the change without interruption of this prescription?    ----- Message -----  From: EJ GEORGES  Sent: 1/11/19 8:43 AM  To: Jazmyn Sinclair  Subject: RE: Adderall XR Prescription REFILL    Jazmyn,    I apologize but this is the first request we have received for a refill of your Adderall XR. I've pended the script for Dr. Vermeesch to send in, it will be available at your pharmacy later this evening.           ----- Message -----     From: Jazmyn Sinclair     Sent: 1/10/2019  4:56 PM EST       To: Marilyn K. Vermeesch, MD  Subject: RE: Adderall XR Prescription REFILL    Hi. I requested a refill last week (on the 3rd),  &  I still have yet to receive a response (today is the 10th.) I am now OUT of medicine & am again requesting a refill on my Adderall XR.   Please advise as soon as is practicable. Thank you for your assistance.    Jazmyn Sinclair  [phone]      ----- Message -----  From: EJ GEORGES  Sent: 9/6/18 9:11 AM  To: Jazmyn Sinclair  Subject: RE: Prescription Question    I've  pended the script for Dr. Vermeesch to send in today.          ----- Message -----     From: Jazmyn Sinclair     Sent: 9/5/2018  8:22 PM EDT       To: Marilyn K. Vermeesch, MD  Subject: Prescription Question    this is my first time trying to use this for a refill, so if I do it incorrectly, please let me know and advise terry I'm new at this.   I am in need of a refill on my Adderall XR.   Please advise.  Thank you,  Jazmyn Sinclair

## 2019-08-05 RX ORDER — LAMOTRIGINE 100 MG/1
100 TABLET ORAL DAILY
Qty: 30 TABLET | Refills: 5 | Status: SHIPPED | OUTPATIENT
Start: 2019-08-05 | End: 2020-01-21 | Stop reason: SDUPTHER

## 2019-08-05 RX ORDER — LAMOTRIGINE 100 MG/1
100 TABLET ORAL DAILY
Qty: 30 TABLET | Refills: 5 | Status: SHIPPED | OUTPATIENT
Start: 2019-08-05 | End: 2019-08-05 | Stop reason: SDUPTHER

## 2019-08-13 RX ORDER — DEXTROAMPHETAMINE SACCHARATE, AMPHETAMINE ASPARTATE MONOHYDRATE, DEXTROAMPHETAMINE SULFATE AND AMPHETAMINE SULFATE 6.25; 6.25; 6.25; 6.25 MG/1; MG/1; MG/1; MG/1
25 CAPSULE, EXTENDED RELEASE ORAL EVERY MORNING
Qty: 30 CAPSULE | Refills: 0 | Status: SHIPPED | OUTPATIENT
Start: 2019-08-13 | End: 2019-08-15 | Stop reason: SDUPTHER

## 2019-08-14 RX ORDER — VENLAFAXINE HYDROCHLORIDE 75 MG/1
225 CAPSULE, EXTENDED RELEASE ORAL DAILY
Qty: 90 CAPSULE | Refills: 0 | Status: SHIPPED | OUTPATIENT
Start: 2019-08-14 | End: 2019-09-06 | Stop reason: SDUPTHER

## 2019-08-15 RX ORDER — DEXTROAMPHETAMINE SACCHARATE, AMPHETAMINE ASPARTATE MONOHYDRATE, DEXTROAMPHETAMINE SULFATE AND AMPHETAMINE SULFATE 6.25; 6.25; 6.25; 6.25 MG/1; MG/1; MG/1; MG/1
25 CAPSULE, EXTENDED RELEASE ORAL EVERY MORNING
Qty: 30 CAPSULE | Refills: 0 | Status: SHIPPED | OUTPATIENT
Start: 2019-08-15 | End: 2019-09-16 | Stop reason: SDUPTHER

## 2019-08-15 RX ORDER — POTASSIUM CHLORIDE 750 MG/1
10 CAPSULE, EXTENDED RELEASE ORAL 2 TIMES DAILY
Qty: 60 CAPSULE | Refills: 1 | Status: SHIPPED | OUTPATIENT
Start: 2019-08-15 | End: 2019-10-30 | Stop reason: SDUPTHER

## 2019-09-04 RX ORDER — MELOXICAM 15 MG/1
TABLET ORAL
Qty: 30 TABLET | Refills: 1 | Status: SHIPPED | OUTPATIENT
Start: 2019-09-04 | End: 2019-10-29 | Stop reason: SDUPTHER

## 2019-09-04 RX ORDER — GUANFACINE 2 MG/1
2 TABLET ORAL
Qty: 30 TABLET | Refills: 5 | Status: SHIPPED | OUTPATIENT
Start: 2019-09-04 | End: 2020-02-18 | Stop reason: SDUPTHER

## 2019-09-04 RX ORDER — OMEPRAZOLE 20 MG/1
20 CAPSULE, DELAYED RELEASE ORAL DAILY
Qty: 30 CAPSULE | Refills: 1 | Status: SHIPPED | OUTPATIENT
Start: 2019-09-04 | End: 2019-10-29 | Stop reason: SDUPTHER

## 2019-09-04 RX ORDER — CETIRIZINE HYDROCHLORIDE 10 MG/1
10 TABLET ORAL DAILY
Qty: 30 TABLET | Refills: 11 | Status: SHIPPED | OUTPATIENT
Start: 2019-09-04 | End: 2020-07-23 | Stop reason: SDUPTHER

## 2019-09-09 RX ORDER — VENLAFAXINE HYDROCHLORIDE 75 MG/1
225 CAPSULE, EXTENDED RELEASE ORAL DAILY
Qty: 90 CAPSULE | Refills: 5 | Status: SHIPPED | OUTPATIENT
Start: 2019-09-09 | End: 2020-03-16 | Stop reason: SDUPTHER

## 2019-09-16 RX ORDER — DEXTROAMPHETAMINE SACCHARATE, AMPHETAMINE ASPARTATE MONOHYDRATE, DEXTROAMPHETAMINE SULFATE AND AMPHETAMINE SULFATE 6.25; 6.25; 6.25; 6.25 MG/1; MG/1; MG/1; MG/1
25 CAPSULE, EXTENDED RELEASE ORAL EVERY MORNING
Qty: 30 CAPSULE | Refills: 0 | Status: SHIPPED | OUTPATIENT
Start: 2019-09-16 | End: 2019-10-16 | Stop reason: SDUPTHER

## 2019-09-16 NOTE — TELEPHONE ENCOUNTER
Regarding: RE: RE: RE:  Adderall XR Prescription REFILL  Contact: [phone]  ----- Message from Lindsey, Generic sent at 9/15/2019 12:18 PM EDT -----    Hi Isela. It's time for my Adderal refill.    Thank you for your time & assistance.??  Jazmyn    ----- Message -----  From: EJ GEORGES  Sent: 8/15/19 3:29 PM  To: Jazmyn Sinclair  Subject: RE: RE: RE:  Adderall XR Prescription REFILL    Balta Jazmyn,      I see what the issues was now. We sent your medication to [facility] instead of [facility]. I apologize for this mistake. I've pended the script for Dr. Vermeesch to send it to [facility] for you. Again, I'm sorry for this.             ----- Message -----     From: Jazmyn Sinclair     Sent: 8/15/2019  1:04 PM EDT       To: Marilyn K. Vermeesch, MD  Subject: RE: RE: RE:  Adderall XR Prescription REFILL    Good afternoon [place].  I just spoke with the pharmacy, and she said they still have not received it.  I assume that means it needs to be resent ? Please advise ASAP.    Again, I appreciate your assistance. :-)    Thank you,   Jazmyn    ----- Message -----  From: EJ GEORGES  Sent: 8/15/19 7:48 AM  To: Jazmyn Sinclair  Subject: RE: RE: RE:  Adderall XR Prescription REFILL    Your effexor was sent in yesterday. It looks like your adderral was sent in late in the day but I'm showing that the pharmacy received it. I would check with them again today and see if they have it. If not, let me know and we'll take care of it.         ----- Message -----     From: Jazmyn Sinclair     Sent: 8/14/2019  4:56 PM EDT       To: Marilyn K. Vermeesch, MD  Subject: RE: RE: RE:  Adderall XR Prescription REFILL    Hi. I received a message yesterday that my adderal had been approved, but the pharmacy says they still don't have it. They also say I need a refill hold in or sent over for my effexor .   I appreciate your time and assistance! :-)    Jazmyn Brooks    ----- Message -----  From: EJ GEORGES  Sent: 8/13/19  4:36 PM  To: Jazmyn Sinclair  Subject: RE: RE: RE:  Adderall XR Prescription REFILL    Refill has been pended to Dr. Vermeesch to be sent in.          ----- Message -----     From: Jazmyn Sinclair     Sent: 8/13/2019  4:33 PM EDT       To: Marilyn K. Vermeesch, MD  Subject: RE: RE: RE:  Adderall XR Prescription REFILL    It's that time agn...  I'm due & need a refill on my Adderall. Please, & tyvm :-)    Jazmyn Brooks    ----- Message -----  From: EJ GEORGES  Sent: 7/15/19 11:06 AM  To: Jazmyn Sinclair  Subject: RE: RE: RE:  Adderall XR Prescription REFILL    Jazmyn,    I've pended the script for Dr. Vermeesch to send in.          ----- Message -----     From: Jazmyn Sinclair     Sent: 7/15/2019 10:31 AM EDT       To: Marilyn K. Vermeesch, MD  Subject: RE: RE: RE:  Adderall XR Prescription REFILL    Hi [place].   It's that time agn... Monday I'm due & need a refill on my Adderall. Please, & tyvm :-)    Jazmyn Brooks    ----- Message -----  From: EJ GEORGES  Sent: 6/17/19 10:50 AM  To: Jazmyn Sinclair  Subject: RE: RE:  Adderall XR Prescription REFILL    Balta Jazmyn,    I've pended the script to Dr. Vermeesch for approval.          ----- Message -----     From: Jazmyn Sinclair     Sent: 6/15/2019  9:25 AM EDT       To: Marilyn K. Vermeesch, MD  Subject: RE: RE:  Adderall XR Prescription REFILL    Hi [place].   It's that time agn... Monday I'm due & need a refill on my Adderall. Please, & tyvm :-)    Jazmyn Durham    ----- Message -----  From: EJ GEORGES  Sent: 4/18/19 10:47 AM  To: Jazmyn Sinclair  Subject: RE:  Adderall XR Prescription REFILL    I've pended your script for Dr. Vermeesch to send in. It will be available at your pharmacy later today.            ----- Message -----     From: Jazmyn Sinclair     Sent: 4/17/2019 11:02 AM EDT       To: Marilyn K. Vermeesch, MD  Subject:  Adderall XR Prescription REFILL    Nicolas Weiss.   It's that time agn... I'm due & need a refill on my Adderall. Please, &  tyvm :-)  Jazmyn Durham    ----- Message -----  From: EJ GEORGES  Sent: 3/12/19 8:58 AM  To: Jazmyn Sinclair  Subject: RE: RE RE: Adderall XR Prescription REFILL    I've pended your script for Dr. Vermeesch to send in. It will be available at your pharmacy later today.        ----- Message -----     From: Jazmyn Sinclair     Sent: 3/11/2019  9:08 PM EDT       To: Marilyn K. Vermeesch, MD  Subject: RE: RE RE: Adderall XR Prescription REFILL    Hi Isela.   It's that time agn... I'm due & need a refill on my Adderall. Please, & tyvm :-)  Jazmyn    ----- Message -----  From: EJ GEORGES  Sent: 2/11/19 5:13 PM  To: Jazmyn Sinclair  Subject: RE: RE: RE: Adderall XR Prescription REFILL    I've pended your script for Dr. Vermeesch to approve.           ----- Message -----     From: Jazmyn Sinclair     Sent: 2/10/2019  2:04 PM EST       To: Marilyn K. Vermeesch, MD  Subject: RE: RE: RE: Adderall XR Prescription REFILL    Hi [place].   It's that time agn... I'm due & need a refill on my Adderall. Please, & tyvm :-Poli Eldridge    ----- Message -----  From: EJ GEORGES  Sent: 1/11/19 10:03 AM  To: Jazmyn Sinclair  Subject: RE: RE: Adderall XR Prescription REFILL    Pharmacy has been updated.          ----- Message -----     From: Jazmyn Sinclair     Sent: 1/11/2019  9:05 AM EST       To: Marilyn K. Vermeesch, MD  Subject: RE: RE: Adderall XR Prescription REFILL    Thank you for your speedy assistance. Additionally I forgot to mention that I have switched pharmacies, I'm now using [facility] on [address], phone [phone]. Can you make the change without interruption of this prescription?    ----- Message -----  From: EJ GEORGES  Sent: 1/11/19 8:43 AM  To: Jazmyn Sinclair  Subject: RE: Adderall XR Prescription REFILL    Jazmyn,    I apologize but this is the first request we have received for a refill of your Adderall XR. I've pended the script for Dr. Vermeesch to send in, it will be  available at your pharmacy later this evening.           ----- Message -----     From: Jazmyn Sinclair     Sent: 1/10/2019  4:56 PM EST       To: Marilyn K. Vermeesch, MD  Subject: RE: Adderall XR Prescription REFILL    Hi. I requested a refill last week (on the 3rd),  &  I still have yet to receive a response (today is the 10th.) I am now OUT of medicine & am again requesting a refill on my Adderall XR.   Please advise as soon as is practicable. Thank you for your assistance.    Jazmyn Sinclair  [phone]      ----- Message -----  From: EJ GEORGES  Sent: 9/6/18 9:11 AM  To: Jazmyn Sinclair  Subject: RE: Prescription Question    I've pended the script for Dr. Vermeesch to send in today.          ----- Message -----     From: Jazmyn Sinclair     Sent: 9/5/2018  8:22 PM EDT       To: Marilyn K. Vermeesch, MD  Subject: Prescription Question    this is my first time trying to use this for a refill, so if I do it incorrectly, please let me know and advise terry I'm new at this.   I am in need of a refill on my Adderall XR.   Please advise.  Thank you,  Jazmyn Sinclair

## 2019-09-24 ENCOUNTER — TELEPHONE (OUTPATIENT)
Dept: INTERNAL MEDICINE | Facility: CLINIC | Age: 49
End: 2019-09-24

## 2019-09-24 NOTE — TELEPHONE ENCOUNTER
Patients  was in for a visit with Dr. Jamil and she was wanting to switch PCPs to Dr. Jamil so the could share the same family provider. Please advise

## 2019-10-16 RX ORDER — DEXTROAMPHETAMINE SACCHARATE, AMPHETAMINE ASPARTATE MONOHYDRATE, DEXTROAMPHETAMINE SULFATE AND AMPHETAMINE SULFATE 6.25; 6.25; 6.25; 6.25 MG/1; MG/1; MG/1; MG/1
25 CAPSULE, EXTENDED RELEASE ORAL EVERY MORNING
Qty: 30 CAPSULE | Refills: 0 | Status: SHIPPED | OUTPATIENT
Start: 2019-10-16 | End: 2019-11-19 | Stop reason: SDUPTHER

## 2019-10-16 NOTE — TELEPHONE ENCOUNTER
Regarding: RE: RE: RE:  Adderall XR Prescription REFILL  Contact: [phone]  ----- Message from Myccarlos, Generic sent at 10/15/2019  4:34 PM EDT -----    Hi [place]. Hope u r having a good day.   It's time for my Adderal refill.      ----- Message -----  From: EJ GEORGES  Sent: 9/16/19 9:20 AM  To: Jazmyn Sinclair  Subject: RE: RE: RE:  Adderall XR Prescription REFILL    Balta Connellyri,      I've pended the script for Dr. Vermeesch to send in for you today.            ----- Message -----     From: Jazmyn Sinclair     Sent: 9/15/2019 12:18 PM EDT       To: Marilyn K. Vermeesch, MD  Subject: RE: RE: RE:  Adderall XR Prescription REFILL    Hi [place]. It's time for my Adderal refill.    Thank you for your time & assistance.??  Jazmyn    ----- Message -----  From: JE GEORGES  Sent: 8/15/19 3:29 PM  To: Jazmyn Sinclair  Subject: RE: RE: RE:  Adderall XR Prescription REFILL    Balta Eldridge,      I see what the issues was now. We sent your medication to [facility] of [facility]. I apologize for this mistake. I've pended the script for Dr. Vermeesch to send it to [facility] for you. Again, I'm sorry for this.             ----- Message -----     From: Jazmyn Sinclair     Sent: 8/15/2019  1:04 PM EDT       To: Marilyn K. Vermeesch, MD  Subject: RE: RE: RE:  Adderall XR Prescription REFILL    Good afternoon [place].  I just spoke with the pharmacy, and she said they still have not received it.  I assume that means it needs to be resent ? Please advise ASAP.    Again, I appreciate your assistance. :-)    Thank you,   Jazmyn    ----- Message -----  From: EJ GEORGES  Sent: 8/15/19 7:48 AM  To: Jazmyn Sinclair  Subject: RE: RE: RE:  Adderall XR Prescription REFILL    Your effexor was sent in yesterday. It looks like your adderral was sent in late in the day but I'm showing that the pharmacy received it. I would check with them again today and see if they have it. If not, let me know and we'll take care of it.          ----- Message -----     From: Jazmyn Sinclair     Sent: 8/14/2019  4:56 PM EDT       To: Marilyn K. Vermeesch, MD  Subject: RE: RE: RE:  Adderall XR Prescription REFILL    Hi. I received a message yesterday that my adderal had been approved, but the pharmacy says they still don't have it. They also say I need a refill hold in or sent over for my effexor .   I appreciate your time and assistance! :-)    Jazmyn Brooks    ----- Message -----  From: EJ GEORGES  Sent: 8/13/19 4:36 PM  To: Jazmyn Sinclair  Subject: RE: RE: RE:  Adderall XR Prescription REFILL    Refill has been pended to Dr. Vermeesch to be sent in.          ----- Message -----     From: Jazmyn Sinclair     Sent: 8/13/2019  4:33 PM EDT       To: Marilyn K. Vermeesch, MD  Subject: RE: RE: RE:  Adderall XR Prescription REFILL    It's that time agn...  I'm due & need a refill on my Adderall. Please, & tyvm :-)    Jazmyn Brooks    ----- Message -----  From: EJ GEORGES  Sent: 7/15/19 11:06 AM  To: Jazmyn Sinclair  Subject: RE: RE: RE:  Adderall XR Prescription REFILL    Jazmyn,    I've pended the script for Dr. Vermeesch to send in.          ----- Message -----     From: Jazmyn Sinclair     Sent: 7/15/2019 10:31 AM EDT       To: Marilyn K. Vermeesch, MD  Subject: RE: RE: RE:  Adderall XR Prescription REFILL    Hi Isela.   It's that time agn... Monday I'm due & need a refill on my Adderall. Please, & tyvm :-)    Jazmyn Brooks    ----- Message -----  From: EJ GEORGES  Sent: 6/17/19 10:50 AM  To: Jazmyn Sinclair  Subject: RE: RE:  Adderall XR Prescription REFILL    Balta Eldridge,    I've pended the script to Dr. Vermeesch for approval.          ----- Message -----     From: Jazmyn Sinclair     Sent: 6/15/2019  9:25 AM EDT       To: Marilyn K. Vermeesch, MD  Subject: RE: RE:  Adderall XR Prescription REFILL    Hi [place].   It's that time agn... Monday I'm due & need a refill on my Adderall. Please, & tyvm :-)    Jazmyn    Thanks,  Jazmyn    -----  Message -----  From: EJ GEORGES  Sent: 4/18/19 10:47 AM  To: Jazmyn Sinclair  Subject: RE:  Adderall XR Prescription REFILL    I've pended your script for Dr. Vermeesch to send in. It will be available at your pharmacy later today.            ----- Message -----     From: Jazmyn Sinclair     Sent: 4/17/2019 11:02 AM EDT       To: Marilyn K. Vermeesch, MD  Subject:  Adderall XR Prescription REFILL    Hi [place].   It's that time agn... I'm due & need a refill on my Adderall. Please, & tyvm :-)  Jazmyn Durham    ----- Message -----  From: EJ GEORGES  Sent: 3/12/19 8:58 AM  To: Jazmyn Sinclair  Subject: RE: RE RE: Adderall XR Prescription REFILL    I've pended your script for Dr. Vermeesch to send in. It will be available at your pharmacy later today.        ----- Message -----     From: Jazmyn Sinclair     Sent: 3/11/2019  9:08 PM EDT       To: Marilyn K. Vermeesch, MD  Subject: RE: RE RE: Adderall XR Prescription REFILL    Hi [place].   It's that time agn... I'm due & need a refill on my Adderall. Please, & tyvm :-)  Jazmyn    ----- Message -----  From: EJ GEORGES  Sent: 2/11/19 5:13 PM  To: Jazmyn Sinclair  Subject: RE: RE: RE: Adderall XR Prescription REFILL    I've pended your script for Dr. Vermeesch to approve.           ----- Message -----     From: Jazmyn Sinclair     Sent: 2/10/2019  2:04 PM EST       To: Marilyn K. Vermeesch, MD  Subject: RE: RE: RE: Adderall XR Prescription REFILL    Hi Is[place].   It's that time agn... I'm due & need a refill on my Adderall. Please, & tyvm :-)  Jazmyn    ----- Message -----  From: EJ GEORGES  Sent: 1/11/19 10:03 AM  To: Jazmyn Sinclair  Subject: RE: RE: Adderall XR Prescription REFILL    Pharmacy has been updated.          ----- Message -----     From: Jazmyn Sinclair     Sent: 1/11/2019  9:05 AM EST       To: Marilyn K. Vermeesch, MD  Subject: RE: RE: Adderall XR Prescription REFILL    Thank you for your speedy assistance. Additionally I forgot to mention  that I have switched pharmacies, I'm now using [facility] on [address], phone [phone]. Can you make the change without interruption of this prescription?    ----- Message -----  From: EJ GEORGES  Sent: 1/11/19 8:43 AM  To: Jazmyn Sinclair  Subject: RE: Adderall XR Prescription REFILL    Jazmyn,    I apologize but this is the first request we have received for a refill of your Adderall XR. I've pended the script for Dr. Vermeesch to send in, it will be available at your pharmacy later this evening.           ----- Message -----     From: Jazmyn Sinclair     Sent: 1/10/2019  4:56 PM EST       To: Marilyn K. Vermeesch, MD  Subject: RE: Adderall XR Prescription REFILL    Hi. I requested a refill last week (on the 3rd),  &  I still have yet to receive a response (today is the 10th.) I am now OUT of medicine & am again requesting a refill on my Adderall XR.   Please advise as soon as is practicable. Thank you for your assistance.    Jazmyn Sinclair  [phone]      ----- Message -----  From: EJ GEORGES  Sent: 9/6/18 9:11 AM  To: Jazmyn Sinclair  Subject: RE: Prescription Question    I've pended the script for Dr. Vermeesch to send in today.          ----- Message -----     From: Jazmyn Sinclair     Sent: 9/5/2018  8:22 PM EDT       To: Marilyn K. Vermeesch, MD  Subject: Prescription Question    this is my first time trying to use this for a refill, so if I do it incorrectly, please let me know and advise terry I'm new at this.   I am in need of a refill on my Adderall XR.   Please advise.  Thank you,  Jazmyn Sinclair

## 2019-10-29 RX ORDER — MELOXICAM 15 MG/1
TABLET ORAL
Qty: 30 TABLET | Refills: 1 | Status: SHIPPED | OUTPATIENT
Start: 2019-10-29 | End: 2019-12-23 | Stop reason: SDUPTHER

## 2019-10-29 RX ORDER — OMEPRAZOLE 20 MG/1
20 CAPSULE, DELAYED RELEASE ORAL DAILY
Qty: 30 CAPSULE | Refills: 12 | Status: SHIPPED | OUTPATIENT
Start: 2019-10-29 | End: 2020-07-23 | Stop reason: SDUPTHER

## 2019-10-30 RX ORDER — POTASSIUM CHLORIDE 750 MG/1
10 CAPSULE, EXTENDED RELEASE ORAL 2 TIMES DAILY
Qty: 60 CAPSULE | Refills: 5 | Status: SHIPPED | OUTPATIENT
Start: 2019-10-30 | End: 2020-07-24 | Stop reason: SDUPTHER

## 2019-11-19 RX ORDER — DEXTROAMPHETAMINE SACCHARATE, AMPHETAMINE ASPARTATE MONOHYDRATE, DEXTROAMPHETAMINE SULFATE AND AMPHETAMINE SULFATE 6.25; 6.25; 6.25; 6.25 MG/1; MG/1; MG/1; MG/1
25 CAPSULE, EXTENDED RELEASE ORAL EVERY MORNING
Qty: 30 CAPSULE | Refills: 0 | Status: SHIPPED | OUTPATIENT
Start: 2019-11-19 | End: 2019-12-16 | Stop reason: SDUPTHER

## 2019-11-19 NOTE — TELEPHONE ENCOUNTER
Regarding: RE: RE: RE: RE:  Adderall XR Prescription REFILL  Contact: [phone]  ----- Message from Lindsey, Generic sent at 11/19/2019 12:10 PM EST -----    Hi [place]. Hope u r having a uneventful day... It's that stressful timeof year.     It's time for my Adderal refill. ( Makein another errand! Lol)        ----- Message -----  From: EJ GEORGES  Sent: 10/16/19 8:49 AM  To: Jazmyn Sinclair  Subject: RE: RE: RE:  Adderall XR Prescription REFILL    Hi Jazmyn, my day is going pretty good. I've pended your refill for Dr. Vermeesch to send in for you later today.             ----- Message -----     From: Jazmyn Sinclair     Sent: 10/15/2019  4:34 PM EDT       To: Marilyn K. Vermeesch, MD  Subject: RE: RE: RE:  Adderall XR Prescription REFILL    Hi Isela. Hope u r having a good day.   It's time for my Adderal refill.      ----- Message -----  From: EJ GEORGES  Sent: 9/16/19 9:20 AM  To: Jazmyn Sinclair  Subject: RE: RE: RE:  Adderall XR Prescription REFILL    Balta Eldridge,      I've pended the script for Dr. Vermeesch to send in for you today.            ----- Message -----     From: Jazmyn Sinclair     Sent: 9/15/2019 12:18 PM EDT       To: Marilyn K. Vermeesch, MD  Subject: RE: RE: RE:  Adderall XR Prescription REFILL    Hi [place]. It's time for my Adderal refill.    Thank you for your time & assistance.??  Jazmyn    ----- Message -----  From: EJ GEORGES  Sent: 8/15/19 3:29 PM  To: Jazmyn Sinclair  Subject: RE: RE: RE:  Adderall XR Prescription REFILL    Bronsondemetri Eldridge,      I see what the issues was now. We sent your medication to [facility]. I apologize for this mistake. I've pended the script for Dr. Vermeesch to send it to [facility] for you. Again, I'm sorry for this.             ----- Message -----     From: Jazmyn Sinclair     Sent: 8/15/2019  1:04 PM EDT       To: Marilyn K. Vermeesch, MD  Subject: RE: RE: RE:  Adderall XR Prescription REFILL    Good afternoon [place].  I  just spoke with the pharmacy, and she said they still have not received it.  I assume that means it needs to be resent ? Please advise ASAP.    Again, I appreciate your assistance. :-)    Thank youJazmyn    ----- Message -----  From: EJ GEORGES  Sent: 8/15/19 7:48 AM  To: Jazmyn Sinclair  Subject: RE: RE: RE:  Adderall XR Prescription REFILL    Your effexor was sent in yesterday. It looks like your adderral was sent in late in the day but I'm showing that the pharmacy received it. I would check with them again today and see if they have it. If not, let me know and we'll take care of it.         ----- Message -----     From: Jazmyn Sinclair     Sent: 8/14/2019  4:56 PM EDT       To: Marilyn K. Vermeesch, MD  Subject: RE: RE: RE:  Adderall XR Prescription REFILL    Hi. I received a message yesterday that my adderal had been approved, but the pharmacy says they still don't have it. They also say I need a refill hold in or sent over for my effexor .   I appreciate your time and assistance! :-)    ThanksJazmyn    ----- Message -----  From: EJ GEORGES  Sent: 8/13/19 4:36 PM  To: Jazmyn Sinclair  Subject: RE: RE: RE:  Adderall XR Prescription REFILL    Refill has been pended to Dr. Vermeesch to be sent in.          ----- Message -----     From: Jazmyn Sinclair     Sent: 8/13/2019  4:33 PM EDT       To: Marilyn K. Vermeesch, MD  Subject: RE: RE: RE:  Adderall XR Prescription REFILL    It's that time agn...  I'm due & need a refill on my Adderall. Please, & tyvm :-)    Jazmyn Broosk    ----- Message -----  From: EJ GEORGES  Sent: 7/15/19 11:06 AM  To: Jazmyn Sinclair  Subject: RE: RE: RE:  Adderall XR Prescription REFILL    Jazmyn,    I've pended the script for Dr. Vermeesch to send in.          ----- Message -----     From: Jazmyn Sinclair     Sent: 7/15/2019 10:31 AM EDT       To: Marilyn K. Vermeesch, MD  Subject: RE: RE: RE:  Adderall XR Prescription REFILL    Hi [place].   It's that time agn... Monday I'm due  & need a refill on my Adderall. Please, & tyvm :-)    Jazmyn Brooks    ----- Message -----  From: EJ GEORGES  Sent: 6/17/19 10:50 AM  To: Jazmyn Sinclair  Subject: RE: RE:  Adderall XR Prescription REFILL    Balta Jazmyn,    I've pended the script to Dr. Vermeesch for approval.          ----- Message -----     From: Jazmyn Sinclair     Sent: 6/15/2019  9:25 AM EDT       To: Marilyn K. Vermeesch, MD  Subject: RE: RE:  Adderall XR Prescription REFILL    Hi Isela.   It's that time agn... Monday I'm due & need a refill on my Adderall. Please, & tyvm :-)    Jazmyn Durham    ----- Message -----  From: EJ GEORGES  Sent: 4/18/19 10:47 AM  To: Jazmyn Sinclair  Subject: RE:  Adderall XR Prescription REFILL    I've pended your script for Dr. Vermeesch to send in. It will be available at your pharmacy later today.            ----- Message -----     From: Jazmyn Sinclair     Sent: 4/17/2019 11:02 AM EDT       To: Marilyn K. Vermeesch, MD  Subject:  Adderall XR Prescription REFILL    Hi Isela.   It's that time agn... I'm due & need a refill on my Adderall. Please, & tyvm :-)  Jazmyn Durham    ----- Message -----  From: EJ GEORGES  Sent: 3/12/19 8:58 AM  To: Jazmyn Sinclair  Subject: RE: RE RE: Adderall XR Prescription REFILL    I've pended your script for Dr. Vermeesch to send in. It will be available at your pharmacy later today.        ----- Message -----     From: Jazmyn Sinclair     Sent: 3/11/2019  9:08 PM EDT       To: Marilyn K. Vermeesch, MD  Subject: RE: RE RE: Adderall XR Prescription REFILL    Hi [place].   It's that time agn... I'm due & need a refill on my Adderall. Please, & tyvm :-)  Jazmyn    ----- Message -----  From: EJ GEORGES  Sent: 2/11/19 5:13 PM  To: Jazmyn Sinclair  Subject: RE: RE: RE: Adderall XR Prescription REFILL    I've pended your script for Dr. Vermeesch to approve.           ----- Message -----     From: Jazmyn Sinclair     Sent: 2/10/2019  2:04 PM EST       To:  Marilyn K. Vermeesch, MD  Subject: RE: RE: RE: Adderall XR Prescription REFILL    Hi [place].   It's that time agn... I'm due & need a refill on my Adderall. Please, & tyvm :-)  Jazmyn    ----- Message -----  From: EJ GEORGES  Sent: 1/11/19 10:03 AM  To: Jazmyn Sinclair  Subject: RE: RE: Adderall XR Prescription REFILL    Pharmacy has been updated.          ----- Message -----     From: Jazmyn Sinclair     Sent: 1/11/2019  9:05 AM EST       To: Marilyn K. Vermeesch, MD  Subject: RE: RE: Adderall XR Prescription REFILL    Thank you for your speedy assistance. Additionally I forgot to mention that I have switched pharmacies, I'm now using [facility] on [address], phone [phone]. Can you make the change without interruption of this prescription?    ----- Message -----  From: EJ GEORGES  Sent: 1/11/19 8:43 AM  To: Jazmyn Sinclair  Subject: RE: Adderall XR Prescription REFILL    Jazmyn,    I apologize but this is the first request we have received for a refill of your Adderall XR. I've pended the script for Dr. Vermeesch to send in, it will be available at your pharmacy later this evening.           ----- Message -----     From: Jazmyn Sinclair     Sent: 1/10/2019  4:56 PM EST       To: Marilyn K. Vermeesch, MD  Subject: RE: Adderall XR Prescription REFILL    Hi. I requested a refill last week (on the 3rd),  &  I still have yet to receive a response (today is the 10th.) I am now OUT of medicine & am again requesting a refill on my Adderall XR.   Please advise as soon as is practicable. Thank you for your assistance.    Jazmyn Sinclair  [phone]      ----- Message -----  From: EJ GEORGES  Sent: 9/6/18 9:11 AM  To: Jazmyn Sinclair  Subject: RE: Prescription Question    I've pended the script for Dr. Vermeesch to send in today.          ----- Message -----     From: Jazmyn Sinclair     Sent: 9/5/2018  8:22 PM EDT       To: Marilyn K. Vermeesch, MD  Subject: Prescription Question    this  is my first time trying to use this for a refill, so if I do it incorrectly, please let me know and advise terry I'm new at this.   I am in need of a refill on my Adderall XR.   Please advise.  Thank you,  Jazmyn Sinclair

## 2019-12-16 DIAGNOSIS — F90.2 ATTENTION DEFICIT HYPERACTIVITY DISORDER (ADHD), COMBINED TYPE: Primary | ICD-10-CM

## 2019-12-16 RX ORDER — DEXTROAMPHETAMINE SACCHARATE, AMPHETAMINE ASPARTATE MONOHYDRATE, DEXTROAMPHETAMINE SULFATE AND AMPHETAMINE SULFATE 6.25; 6.25; 6.25; 6.25 MG/1; MG/1; MG/1; MG/1
25 CAPSULE, EXTENDED RELEASE ORAL EVERY MORNING
Qty: 30 CAPSULE | Refills: 0 | Status: SHIPPED | OUTPATIENT
Start: 2019-12-16 | End: 2020-01-28 | Stop reason: SDUPTHER

## 2019-12-16 NOTE — TELEPHONE ENCOUNTER
----- Message from Jazmyn Sinclair sent at 12/16/2019 11:13 AM EST -----  Regarding: RE:  Adderall XR Prescription REFILL  Contact: [phone]  Good morning Isela. I hope you're somehow able to keep dry today!     It's that time again. I need my refill of adderal please.     FYI: The appointment for my wellness exam is scheduled for January 23rd... 'first available' ! LOL but we all know how hectic the month of December can be. So a very happy holidays to you & yours!     ----- Message -----  From: EJ GEORGES  Sent: 11/19/19 1:06 PM  To: Jazmyn Sinclair  Subject: RE: RE: RE: RE:  Adderall XR Prescription REFILL    Balta Eldridge, I've pended your script for Dr. Vermeesch to send in. Looking at your chart you are due for a follow up appointment. Please make a follow up appointment to see her either this month or next.             ----- Message -----     From: Jazmyn Sinclair     Sent: 11/19/2019 12:10 PM EST       To: Marilyn K. Vermeesch, MD  Subject: RE: RE: RE: RE:  Adderall XR Prescription REFILL    Hi [place]. Hope u r having a uneventful day... It's that stressful timeof year.     It's time for my Adderal refill. ( Makein another errand! Lol)        ----- Message -----  From: EJ GEORGES  Sent: 10/16/19 8:49 AM  To: Jazmyn Sinclair  Subject: RE: RE: RE:  Adderall XR Prescription REFILL    Hi Jazmyn, my day is going pretty good. I've pended your refill for Dr. Vermeesch to send in for you later today.             ----- Message -----     From: Jazmyn Sinclair     Sent: 10/15/2019  4:34 PM EDT       To: Marilyn K. Vermeesch, MD  Subject: RE: RE: RE:  Adderall XR Prescription REFILL    Hi Isela. Hope u r having a good day.   It's time for my Adderal refill.      ----- Message -----  From: EJ GEORGES  Sent: 9/16/19 9:20 AM  To: Jazmyn Sinclair  Subject: RE: RE: RE:  Adderall XR Prescription REFILL    Balta Eldridge,      I've pended the script for Dr. Vermeesch to send in for you today.            ----- Message  -----     From: Jazmyn Sinclair     Sent: 9/15/2019 12:18 PM EDT       To: Marilyn K. Vermeesch, MD  Subject: RE: RE: RE:  Adderall XR Prescription REFILL    Hi [place]. It's time for my Adderal refill.    Thank you for your time & assistance.??  Jazmyn    ----- Message -----  From: EJ GEORGES  Sent: 8/15/19 3:29 PM  To: Jazmyn Sinclair  Subject: RE: RE: RE:  Adderall XR Prescription REFILL    Balta Eldridge,      I see what the issues was now. We sent your medication to [facility] instead of [facility]. I apologize for this mistake. I've pended the script for Dr. Vermeesch to send it to [facility] for you. Again, I'm sorry for this.             ----- Message -----     From: Jazmyn Sinclair     Sent: 8/15/2019  1:04 PM EDT       To: Marilyn K. Vermeesch, MD  Subject: RE: RE: RE:  Adderall XR Prescription REFILL    Good afternoon [place].  I just spoke with the pharmacy, and she said they still have not received it.  I assume that means it needs to be resent ? Please advise ASAP.    Again, I appreciate your assistance. :-)    Thank you,   Jazmyn    ----- Message -----  From: EJ GEORGES  Sent: 8/15/19 7:48 AM  To: Jazmyn Sinclair  Subject: RE: RE: RE:  Adderall XR Prescription REFILL    Your effexor was sent in yesterday. It looks like your adderral was sent in late in the day but I'm showing that the pharmacy received it. I would check with them again today and see if they have it. If not, let me know and we'll take care of it.         ----- Message -----     From: Jazmyn Sinclair     Sent: 8/14/2019  4:56 PM EDT       To: Marilyn K. Vermeesch, MD  Subject: RE: RE: RE:  Adderall XR Prescription REFILL    Hi. I received a message yesterday that my adderal had been approved, but the pharmacy says they still don't have it. They also say I need a refill hold in or sent over for my effexor .   I appreciate your time and assistance! :-)    Jazmyn Brooks    ----- Message -----  From: EJ GEORGES  Sent: 8/13/19  4:36 PM  To: Jazmyn Sinclair  Subject: RE: RE: RE:  Adderall XR Prescription REFILL    Refill has been pended to Dr. Vermeesch to be sent in.          ----- Message -----     From: Jazmyn Sinclair     Sent: 8/13/2019  4:33 PM EDT       To: Marilyn K. Vermeesch, MD  Subject: RE: RE: RE:  Adderall XR Prescription REFILL    It's that time agn...  I'm due & need a refill on my Adderall. Please, & tyvm :-)    Jazmyn Brooks    ----- Message -----  From: EJ GEORGES  Sent: 7/15/19 11:06 AM  To: Jamzyn Sinclair  Subject: RE: RE: RE:  Adderall XR Prescription REFILL    Jazmyn,    I've pended the script for Dr. Vermeesch to send in.          ----- Message -----     From: Jazmyn Sinclair     Sent: 7/15/2019 10:31 AM EDT       To: Marilyn K. Vermeesch, MD  Subject: RE: RE: RE:  Adderall XR Prescription REFILL    Hi [place].   It's that time agn... Monday I'm due & need a refill on my Adderall. Please, & tyvm :-)    Jazmyn Brooks    ----- Message -----  From: EJ GEORGES  Sent: 6/17/19 10:50 AM  To: Jazmyn Sinclair  Subject: RE: RE:  Adderall XR Prescription REFILL    Balta Jazmyn,    I've pended the script to Dr. Vermeesch for approval.          ----- Message -----     From: Jazmyn Sinclair     Sent: 6/15/2019  9:25 AM EDT       To: Marilyn K. Vermeesch, MD  Subject: RE: RE:  Adderall XR Prescription REFILL    Hi [place].   It's that time agn... Monday I'm due & need a refill on my Adderall. Please, & tyvm :-)    Jazmyn Durham    ----- Message -----  From: EJ GEORGES  Sent: 4/18/19 10:47 AM  To: Jazmyn Sinclair  Subject: RE:  Adderall XR Prescription REFILL    I've pended your script for Dr. Vermeesch to send in. It will be available at your pharmacy later today.            ----- Message -----     From: Jazmyn Sinclair     Sent: 4/17/2019 11:02 AM EDT       To: Marilyn K. Vermeesch, MD  Subject:  Adderall XR Prescription REFILL    Nicolas Weiss.   It's that time agn... I'm due & need a refill on my Adderall. Please, &  tyvm :-)  Jazmyn Durham    ----- Message -----  From: EJ GEORGES  Sent: 3/12/19 8:58 AM  To: Jazmyn Sinclair  Subject: RE: RE RE: Adderall XR Prescription REFILL    I've pended your script for Dr. Vermeesch to send in. It will be available at your pharmacy later today.        ----- Message -----     From: Jazmyn Sinclair     Sent: 3/11/2019  9:08 PM EDT       To: Marilyn K. Vermeesch, MD  Subject: RE: RE RE: Adderall XR Prescription REFILL    Hi [place].   It's that time agn... I'm due & need a refill on my Adderall. Please, & tyvm :-)  Jazmyn    ----- Message -----  From: EJ GEORGES  Sent: 2/11/19 5:13 PM  To: Jazmyn Sinclair  Subject: RE: RE: RE: Adderall XR Prescription REFILL    I've pended your script for Dr. Vermeesch to approve.           ----- Message -----     From: Jazmny Sinclair     Sent: 2/10/2019  2:04 PM EST       To: Marilyn K. Vermeesch, MD  Subject: RE: RE: RE: Adderall XR Prescription REFILL    Hi [place].   It's that time agn... I'm due & need a refill on my Adderall. Please, & tyvm :-Poli Eldridge    ----- Message -----  From: EJ GEORGES  Sent: 1/11/19 10:03 AM  To: Jazmyn Sinclair  Subject: RE: RE: Adderall XR Prescription REFILL    Pharmacy has been updated.          ----- Message -----     From: Jazmyn Sinclair     Sent: 1/11/2019  9:05 AM EST       To: Marilyn K. Vermeesch, MD  Subject: RE: RE: Adderall XR Prescription REFILL    Thank you for your speedy assistance. Additionally I forgot to mention that I have switched pharmacies, I'm now using [facility] on [address], phone [phone]. Can you make the change without interruption of this prescription?    ----- Message -----  From: EJ GEORGES  Sent: 1/11/19 8:43 AM  To: Jazmyn Sinclair  Subject: RE: Adderall XR Prescription REFILL    Jazmyn,    I apologize but this is the first request we have received for a refill of your Adderall XR. I've pended the script for Dr. Vermeesch to send in, it will be  available at your pharmacy later this evening.           ----- Message -----     From: Jazmyn Sinclair     Sent: 1/10/2019  4:56 PM EST       To: Marilyn K. Vermeesch, MD  Subject: RE: Adderall XR Prescription REFILL    Hi. I requested a refill last week (on the 3rd),  &  I still have yet to receive a response (today is the 10th.) I am now OUT of medicine & am again requesting a refill on my Adderall XR.   Please advise as soon as is practicable. Thank you for your assistance.    Jazmyn Sinclair  [phone]      ----- Message -----  From: EJ GEORGES  Sent: 9/6/18 9:11 AM  To: Jazmyn Sinclair  Subject: RE: Prescription Question    I've pended the script for Dr. Vermeesch to send in today.          ----- Message -----     From: Jazmyn Sinclair     Sent: 9/5/2018  8:22 PM EDT       To: Marilyn K. Vermeesch, MD  Subject: Prescription Question    this is my first time trying to use this for a refill, so if I do it incorrectly, please let me know and advise terry I'm new at this.   I am in need of a refill on my Adderall XR.   Please advise.  Thank you,  Jazmyn Sinclair

## 2019-12-23 RX ORDER — MELOXICAM 15 MG/1
TABLET ORAL
Qty: 30 TABLET | Refills: 1 | Status: SHIPPED | OUTPATIENT
Start: 2019-12-23 | End: 2020-02-19 | Stop reason: SDUPTHER

## 2019-12-23 RX ORDER — FLUTICASONE PROPIONATE 50 MCG
2 SPRAY, SUSPENSION (ML) NASAL DAILY
Qty: 1 BOTTLE | Refills: 5 | Status: SHIPPED | OUTPATIENT
Start: 2019-12-23 | End: 2020-06-15 | Stop reason: SDUPTHER

## 2020-01-01 NOTE — TELEPHONE ENCOUNTER
Kg printed, script pended.     Pt advised yesterday evening was the first refill request we've received for this medication. Pt advised it will be sent in today.   91

## 2020-01-21 RX ORDER — LAMOTRIGINE 100 MG/1
100 TABLET ORAL DAILY
Qty: 30 TABLET | Refills: 0 | Status: SHIPPED | OUTPATIENT
Start: 2020-01-21 | End: 2020-02-19 | Stop reason: SDUPTHER

## 2020-01-28 ENCOUNTER — OFFICE VISIT (OUTPATIENT)
Dept: INTERNAL MEDICINE | Facility: CLINIC | Age: 50
End: 2020-01-28

## 2020-01-28 ENCOUNTER — RESULTS ENCOUNTER (OUTPATIENT)
Dept: INTERNAL MEDICINE | Facility: CLINIC | Age: 50
End: 2020-01-28

## 2020-01-28 VITALS
BODY MASS INDEX: 27.82 KG/M2 | WEIGHT: 167 LBS | SYSTOLIC BLOOD PRESSURE: 128 MMHG | TEMPERATURE: 98.4 F | HEIGHT: 65 IN | HEART RATE: 90 BPM | DIASTOLIC BLOOD PRESSURE: 80 MMHG | OXYGEN SATURATION: 98 %

## 2020-01-28 DIAGNOSIS — Z82.0 FAMILY HISTORY OF CHARCOT-MARIE-TOOTH DISEASE: ICD-10-CM

## 2020-01-28 DIAGNOSIS — F32.A ANXIETY AND DEPRESSION: ICD-10-CM

## 2020-01-28 DIAGNOSIS — F41.9 ANXIETY AND DEPRESSION: ICD-10-CM

## 2020-01-28 DIAGNOSIS — S09.90XA TRAUMATIC INJURY OF HEAD, INITIAL ENCOUNTER: ICD-10-CM

## 2020-01-28 DIAGNOSIS — F90.2 ATTENTION DEFICIT HYPERACTIVITY DISORDER (ADHD), COMBINED TYPE: ICD-10-CM

## 2020-01-28 DIAGNOSIS — Z12.31 SCREENING MAMMOGRAM, ENCOUNTER FOR: ICD-10-CM

## 2020-01-28 DIAGNOSIS — Z51.81 ENCOUNTER FOR THERAPEUTIC DRUG MONITORING: ICD-10-CM

## 2020-01-28 DIAGNOSIS — K21.9 GASTROESOPHAGEAL REFLUX DISEASE WITHOUT ESOPHAGITIS: Primary | ICD-10-CM

## 2020-01-28 PROCEDURE — 99214 OFFICE O/P EST MOD 30 MIN: CPT | Performed by: INTERNAL MEDICINE

## 2020-01-28 RX ORDER — DEXTROAMPHETAMINE SACCHARATE, AMPHETAMINE ASPARTATE MONOHYDRATE, DEXTROAMPHETAMINE SULFATE AND AMPHETAMINE SULFATE 6.25; 6.25; 6.25; 6.25 MG/1; MG/1; MG/1; MG/1
25 CAPSULE, EXTENDED RELEASE ORAL EVERY MORNING
Qty: 30 CAPSULE | Refills: 0 | Status: SHIPPED | OUTPATIENT
Start: 2020-01-28 | End: 2020-02-24 | Stop reason: SDUPTHER

## 2020-01-28 NOTE — PROGRESS NOTES
Chief Complaint   Patient presents with   • Follow-up     for anxiety, depression, ADHD, and GERD. Pt states she's had a watery saltly substance come out of her nose, she's having ringing in her ears, and headaches. Pt states she fell about a month ago, hit her head, blacked her right eye. Pt states her sxs remind her of when she had a spinal fluid leak X 10 years ago.      Subjective   Jazmyn Sinclair is a 49 y.o. female.     Here for follow up of anxiety and depression, gastroesophageal reflux disease, migraine headaches, anemia, chronic back pain status post multiple surgeries, ADHD.  Anxiety/depression- currently on effexor and lamictal.  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  His doctors have told her she needs some genetic testing for this illness. She is taking melatonin 10 mg to help with sleep.  GERD- taking omeprazole and sxs are well controlled  Migraines- she is taking imitrex as needed.  She remains on topamax and has migraines about 1-2 times a week.   Chronic pain- managed per Dr Moreland.    ADHD- she remains on adderall and good control of sxs.  She has been on same dose of Adderall for many years after she was diagnosed with ADHD per her psychiatrist  Allergies- she is using flonase and zyrtec  HCM- last mammogram 2018.  She needs Hep A vaccine and shingrix.  She had colonoscopy 2018 and was told next one is 10 yrs.   She fell about a month ago and landed on a marble coffee table on her head.  She had a black right eye.  A few days later she had salty tasting watery discharge out of her right nostril.  She still has this intermittently.  She has ringing in her ears also since the fall.  She does have HAs that are worse with sitting upright-these are not migraines.  Imitrex does not help.  No OTC meds help.         The following portions of the patient's history were reviewed and updated as appropriate: allergies, current medications, past family history, past medical  "history, past social history, past surgical history and problem list.    Review of Systems   Constitutional: Negative for activity change, appetite change and unexpected weight change.   HENT: Positive for rhinorrhea and tinnitus.    Eyes: Negative for visual disturbance.   Respiratory: Negative for shortness of breath.    Cardiovascular: Negative for chest pain, palpitations and leg swelling.   Gastrointestinal: Negative for abdominal pain.   Musculoskeletal: Positive for back pain.   Allergic/Immunologic: Positive for environmental allergies.   Neurological: Positive for headaches.   Psychiatric/Behavioral: Positive for dysphoric mood and sleep disturbance. The patient is not nervous/anxious.        Objective   /80   Pulse 90   Temp 98.4 °F (36.9 °C)   Ht 165.1 cm (65\")   Wt 75.8 kg (167 lb)   LMP  (LMP Unknown)   SpO2 98%   BMI 27.79 kg/m²   Body mass index is 27.79 kg/m².  Physical Exam   Constitutional: She is oriented to person, place, and time. She appears well-developed and well-nourished. No distress.   Pleasant female, she appears her stated age, no distress today   HENT:   Head: Normocephalic and atraumatic.   Right Ear: External ear normal.   Left Ear: External ear normal.   Mouth/Throat: Oropharynx is clear and moist.   No hemotympanum noted, turbinates appear normal, clear postnasal drip noted, no rhinorrhea or blood in nose, no septal hematoma   Eyes: Pupils are equal, round, and reactive to light. Conjunctivae and EOM are normal. Right eye exhibits no discharge. Left eye exhibits no discharge.   Neck: Normal range of motion. Neck supple. No thyromegaly present.   Cardiovascular: Normal rate, regular rhythm, normal heart sounds and intact distal pulses.   No murmur heard.  No carotid bruits   Pulmonary/Chest: Effort normal and breath sounds normal. No respiratory distress. She has no wheezes.   Abdominal: Soft. Bowel sounds are normal. She exhibits no distension. There is no tenderness. "   Musculoskeletal: Normal range of motion. She exhibits no edema.   Lymphadenopathy:     She has no cervical adenopathy.   Neurological: She is alert and oriented to person, place, and time. No cranial nerve deficit. Coordination normal.   Psychiatric: Her behavior is normal. Judgment and thought content normal.   Patient has a flat affect today, she is intermittently tearful when we talk about her home stressors   Nursing note and vitals reviewed.      Assessment/Plan   Jazmyn Sinclair is here today and the following problems have been addressed:      Jazmyn was seen today for follow-up.    Diagnoses and all orders for this visit:    Gastroesophageal reflux disease without esophagitis    Anxiety and depression    Attention deficit hyperactivity disorder (ADHD), combined type    Screening mammogram, encounter for  -     Mammo Screening Digital Tomosynthesis Bilateral With CAD; Future    Traumatic injury of head, initial encounter  -     CT Head Without Contrast; Future    Family history of Charcot-Judith-Tooth disease  -     Ambulatory Referral to Genetics    refer to genetics for testing for CMT syndrome  Mammogram ordered  Ordered CT head to rule out skull fracture due to trauma approximately 1 month ago and intermittent clear watery rhinorrhea from nose  UDS today  Refill given on Adderall today, no signs of abuse, patient does not call early and Kg is appropriate  Continue current medications for depression, patient has situational stressors that are worsening her symptoms at this time  Continue omeprazole for GERD    RTC for medicare wellness in  4 weeks    Kg Report  The patient has read and signed the [facility] Controlled Substance Contract. The patient is aware of the potential for addiction and dependence. A Kg was reviewed and scanned into the chart.  Narcotic contract was signed by patient.   As part of this patient's treatment plan I am prescribing controlled substances. The patient has  been made aware of appropriate use of such medications, including potential risk of somnolence, limited ability to drive and/or work safely, and potential for dependence or overdose. It has also been made clear that these medications are for use by this patient only, without concomitant use of alcohol or other substances unless prescribed.   Patient has completed prescribing agreement detailing terms of continued prescribing of controlled substances, including monitoring KRISTA reports, urine drug screening, and pill counts if necessary. The patient is aware that inappropriate use will result in cessation of prescribing such medications.   History and physical exam exhibit continued safe and appropriate use of controlled substances      Please note that portions of this note were completed with a voice recognition program.  Efforts were made to edit dictation, but occasionally words are mistranscribed.

## 2020-01-30 ENCOUNTER — TELEPHONE (OUTPATIENT)
Dept: GENETICS | Facility: HOSPITAL | Age: 50
End: 2020-01-30

## 2020-01-30 NOTE — TELEPHONE ENCOUNTER
Received referral for Genetics.  Patient needs to be seen at . In-basket note sent to referring provider.

## 2020-02-03 ENCOUNTER — TELEPHONE (OUTPATIENT)
Dept: GENETICS | Facility: HOSPITAL | Age: 50
End: 2020-02-03

## 2020-02-03 NOTE — TELEPHONE ENCOUNTER
Received Genetic referral from Dr. Vermeesch for CMT. Amanda Posadas routed in-basket to Dr. Vermeesch that patient needs to be seen at  by MD  for this indication.

## 2020-02-14 ENCOUNTER — TELEPHONE (OUTPATIENT)
Dept: INTERNAL MEDICINE | Facility: CLINIC | Age: 50
End: 2020-02-14

## 2020-02-14 NOTE — TELEPHONE ENCOUNTER
Vermeesch, Marilyn K, MD Jackson, Emily             Yes you may close    Previous Messages      ----- Message -----   From: Holly Williamson   Sent: 2/14/2020  11:10 AM EST   To: Marilyn K Vermeesch, MD   Subject: Unable to Contact                                 Scheduling tried to call patient on 1/29 1/30 1/31 to schedule Mammogram and Head CT   We mailed a letter on 1/31 with the scheduling information so they can set up their appointment   They still have not done so   Can we close?

## 2020-02-18 RX ORDER — GUANFACINE 2 MG/1
2 TABLET ORAL
Qty: 30 TABLET | Refills: 5 | Status: SHIPPED | OUTPATIENT
Start: 2020-02-18 | End: 2020-07-23 | Stop reason: SDUPTHER

## 2020-02-19 RX ORDER — LAMOTRIGINE 100 MG/1
100 TABLET ORAL DAILY
Qty: 30 TABLET | Refills: 5 | Status: SHIPPED | OUTPATIENT
Start: 2020-02-19 | End: 2020-07-23 | Stop reason: SDUPTHER

## 2020-02-19 RX ORDER — MELOXICAM 15 MG/1
TABLET ORAL
Qty: 30 TABLET | Refills: 5 | Status: SHIPPED | OUTPATIENT
Start: 2020-02-19 | End: 2020-07-23 | Stop reason: SDUPTHER

## 2020-02-24 DIAGNOSIS — F90.2 ATTENTION DEFICIT HYPERACTIVITY DISORDER (ADHD), COMBINED TYPE: ICD-10-CM

## 2020-02-25 RX ORDER — DEXTROAMPHETAMINE SACCHARATE, AMPHETAMINE ASPARTATE MONOHYDRATE, DEXTROAMPHETAMINE SULFATE AND AMPHETAMINE SULFATE 6.25; 6.25; 6.25; 6.25 MG/1; MG/1; MG/1; MG/1
25 CAPSULE, EXTENDED RELEASE ORAL EVERY MORNING
Qty: 30 CAPSULE | Refills: 0 | Status: SHIPPED | OUTPATIENT
Start: 2020-02-25 | End: 2020-03-23 | Stop reason: SDUPTHER

## 2020-03-16 RX ORDER — VENLAFAXINE HYDROCHLORIDE 75 MG/1
225 CAPSULE, EXTENDED RELEASE ORAL DAILY
Qty: 90 CAPSULE | Refills: 1 | Status: SHIPPED | OUTPATIENT
Start: 2020-03-16 | End: 2020-05-12 | Stop reason: SDUPTHER

## 2020-03-23 DIAGNOSIS — F90.2 ATTENTION DEFICIT HYPERACTIVITY DISORDER (ADHD), COMBINED TYPE: ICD-10-CM

## 2020-03-23 RX ORDER — DEXTROAMPHETAMINE SACCHARATE, AMPHETAMINE ASPARTATE MONOHYDRATE, DEXTROAMPHETAMINE SULFATE AND AMPHETAMINE SULFATE 6.25; 6.25; 6.25; 6.25 MG/1; MG/1; MG/1; MG/1
25 CAPSULE, EXTENDED RELEASE ORAL EVERY MORNING
Qty: 30 CAPSULE | Refills: 0 | Status: SHIPPED | OUTPATIENT
Start: 2020-03-23 | End: 2020-04-22 | Stop reason: SDUPTHER

## 2020-03-23 NOTE — TELEPHONE ENCOUNTER
----- Message from Jazmyn Sinclair sent at 3/23/2020  7:19 AM EDT -----  Regarding: RE:  Adderall XR Prescription REFILL  Contact: [phone]  Good morning (from isolation) Isela. Hope u r able 2 stay safe.    It's that time again. I need my refill of adderal please.             ----- Message -----  From: EJ GEORGES  Sent: 12/16/19 2:26 PM  To: Jazmyn Sinclair  Subject: RE:  Adderall XR Prescription REFILL    I've pended the refill for Dr. Vermeesch to send in.            ----- Message -----     From: Jazmyn Sinclair     Sent: 12/16/2019 11:13 AM EST       To: Marilyn K. Vermeesch, MD  Subject: RE:  Adderall XR Prescription REFILL    Good morning [place]. I hope you're somehow able to keep dry today!     It's that time again. I need my refill of adderal please.     FYI: The appointment for my wellness exam is scheduled for January 23rd... 'first available' ! LOL but we all know how hectic the month of December can be. So a very happy holidays to you & yours!     ----- Message -----  From: EJ GEORGES  Sent: 11/19/19 1:06 PM  To: Jazmyn Sinclair  Subject: RE: RE: RE: RE:  Adderall XR Prescription REFILL    Balta Eldridge, I've pended your script for Dr. Vermeesch to send in. Looking at your chart you are due for a follow up appointment. Please make a follow up appointment to see her either this month or next.             ----- Message -----     From: Jazmyn Sinclair     Sent: 11/19/2019 12:10 PM EST       To: Marilyn K. Vermeesch, MD  Subject: RE: RE: RE: RE:  Adderall XR Prescription REFILL    Hi [place]. Hope u r having a uneventful day... It's that stressful timeof year.     It's time for my Adderal refill. ( Makein another errand! Lol)        ----- Message -----  From: EJ GEORGES  Sent: 10/16/19 8:49 AM  To: Jazmyn Sinclair  Subject: RE: RE: RE:  Adderall XR Prescription REFILL    Hi Jazmyn, my day is going pretty good. I've pended your refill for Dr. Vermeesch to send in for you later today.              ----- Message -----     From: Jazmyn Sinclair     Sent: 10/15/2019  4:34 PM EDT       To: Marilyn K. Vermeesch, MD  Subject: RE: RE: RE:  Adderall XR Prescription REFILL    Hi Isela. Hope u r having a good day.   It's time for my Adderal refill.      ----- Message -----  From: EJ GEORGES  Sent: 9/16/19 9:20 AM  To: Jazmyn Sinclair  Subject: RE: RE: RE:  Adderall XR Prescription REFILL    Balta Connellyri,      I've pended the script for Dr. Vermeesch to send in for you today.            ----- Message -----     From: Jazmyn Sinclair     Sent: 9/15/2019 12:18 PM EDT       To: Marilyn K. Vermeesch, MD  Subject: RE: RE: RE:  Adderall XR Prescription REFILL    Hi Isela. It's time for my Adderal refill.    Thank you for your time & assistance.??  Jazmyn    ----- Message -----  From: EJ GEORGES  Sent: 8/15/19 3:29 PM  To: Jazmyn Sinclair  Subject: RE: RE: RE:  Adderall XR Prescription REFILL    Balta Eldridge,      I see what the issues was now. We sent your medication to [facility] instead of [facility]. I apologize for this mistake. I've pended the script for Dr. Vermeesch to send it to [facility] for you. Again, I'm sorry for this.             ----- Message -----     From: Jazmyn Sinclair     Sent: 8/15/2019  1:04 PM EDT       To: Marilyn K. Vermeesch, MD  Subject: RE: RE: RE:  Adderall XR Prescription REFILL    Good afternoon [place].  I just spoke with the pharmacy, and she said they still have not received it.  I assume that means it needs to be resent ? Please advise ASAP.    Again, I appreciate your assistance. :-)    Thank you,   Jazmyn    ----- Message -----  From: EJ GEORGES  Sent: 8/15/19 7:48 AM  To: Jazmyn Sinclair  Subject: RE: RE: RE:  Adderall XR Prescription REFILL    Your effexor was sent in yesterday. It looks like your adderral was sent in late in the day but I'm showing that the pharmacy received it. I would check with them again today and see if they have it. If not, let me know  and we'll take care of it.         ----- Message -----     From: Jazmyn Sinclair     Sent: 8/14/2019  4:56 PM EDT       To: Marilyn K. Vermeesch, MD  Subject: RE: RE: RE:  Adderall XR Prescription REFILL    Hi. I received a message yesterday that my adderal had been approved, but the pharmacy says they still don't have it. They also say I need a refill hold in or sent over for my effexor .   I appreciate your time and assistance! :-)    Jazmyn Brooks    ----- Message -----  From: EJ GEORGES  Sent: 8/13/19 4:36 PM  To: Jazmyn Sinclair  Subject: RE: RE: RE:  Adderall XR Prescription REFILL    Refill has been pended to Dr. Vermeesch to be sent in.          ----- Message -----     From: Jazmyn Sinclair     Sent: 8/13/2019  4:33 PM EDT       To: Marilyn K. Vermeesch, MD  Subject: RE: RE: RE:  Adderall XR Prescription REFILL    It's that time agn...  I'm due & need a refill on my Adderall. Please, & tyvm :-)    Jazmyn Brooks    ----- Message -----  From: EJ GEORGES  Sent: 7/15/19 11:06 AM  To: Jazmyn Sinclair  Subject: RE: RE: RE:  Adderall XR Prescription REFILL    Jazmyn,    I've pended the script for Dr. Vermeesch to send in.          ----- Message -----     From: Jazmyn Sinclair     Sent: 7/15/2019 10:31 AM EDT       To: Marilyn K. Vermeesch, MD  Subject: RE: RE: RE:  Adderall XR Prescription REFILL    Hi Isela.   It's that time agn... Monday I'm due & need a refill on my Adderall. Please, & tyvm :-)    Jazmyn Brooks    ----- Message -----  From: EJ GEORGES  Sent: 6/17/19 10:50 AM  To: Jazmyn Sinclair  Subject: RE: RE:  Adderall XR Prescription REFILL    Balta Eldridge,    I've pended the script to Dr. Vermeesch for approval.          ----- Message -----     From: Jazmyn Sinclair     Sent: 6/15/2019  9:25 AM EDT       To: Marilyn K. Vermeesch, MD  Subject: RE: RE:  Adderall XR Prescription REFILL    Hi [place].   It's that time agn... Monday I'm due & need a refill on my Adderall. Please, & tyvm  :-)    Jazmyn Durham    ----- Message -----  From: EJ GEORGES  Sent: 4/18/19 10:47 AM  To: Jazmyn Sinclair  Subject: RE:  Adderall XR Prescription REFILL    I've pended your script for Dr. Vermeesch to send in. It will be available at your pharmacy later today.            ----- Message -----     From: Jazmyn Sinclair     Sent: 4/17/2019 11:02 AM EDT       To: Marilyn K. Vermeesch, MD  Subject:  Adderall XR Prescription REFILL    Hi Isela.   It's that time agn... I'm due & need a refill on my Adderall. Please, & tyvm :-)  Jazmyn Durham    ----- Message -----  From: EJ GEORGES  Sent: 3/12/19 8:58 AM  To: Jazmyn Sinclair  Subject: RE: RE RE: Adderall XR Prescription REFILL    I've pended your script for Dr. Vermeesch to send in. It will be available at your pharmacy later today.        ----- Message -----     From: Jazmyn Sinclair     Sent: 3/11/2019  9:08 PM EDT       To: Marilyn K. Vermeesch, MD  Subject: RE: RE RE: Adderall XR Prescription REFILL    Hi [place].   It's that time agn... I'm due & need a refill on my Adderall. Please, & tyvm :-)  Jazmyn    ----- Message -----  From: EJ GEORGES  Sent: 2/11/19 5:13 PM  To: Jazmyn Sinclair  Subject: RE: RE: RE: Adderall XR Prescription REFILL    I've pended your script for Dr. Vermeesch to approve.           ----- Message -----     From: Jazmyn Sinclair     Sent: 2/10/2019  2:04 PM EST       To: Marilyn K. Vermeesch, MD  Subject: RE: RE: RE: Adderall XR Prescription REFILL    Hi Isela.   It's that time agn... I'm due & need a refill on my Adderall. Please, & tyvm :-)  Jazmyn    ----- Message -----  From: EJ GEORGES  Sent: 1/11/19 10:03 AM  To: Jazmyn Sinclair  Subject: RE: RE: Adderall XR Prescription REFILL    Pharmacy has been updated.          ----- Message -----     From: Jazmyn Sinclair     Sent: 1/11/2019  9:05 AM EST       To: Marilyn K. Vermeesch, MD  Subject: RE: RE: Adderall XR Prescription REFILL    Thank you for your speedy  assistance. Additionally I forgot to mention that I have switched pharmacies, I'm now using [facility] on [address], phone [phone]. Can you make the change without interruption of this prescription?    ----- Message -----  From: EJ GEORGES  Sent: 1/11/19 8:43 AM  To: Jazmyn Sinclair  Subject: RE: Adderall XR Prescription REFILL    Jazmyn,    I apologize but this is the first request we have received for a refill of your Adderall XR. I've pended the script for Dr. Vermeesch to send in, it will be available at your pharmacy later this evening.           ----- Message -----     From: Jazmyn Sinclair     Sent: 1/10/2019  4:56 PM EST       To: Marilyn K. Vermeesch, MD  Subject: RE: Adderall XR Prescription REFILL    Hi. I requested a refill last week (on the 3rd),  &  I still have yet to receive a response (today is the 10th.) I am now OUT of medicine & am again requesting a refill on my Adderall XR.   Please advise as soon as is practicable. Thank you for your assistance.    Jazmyn Sinclair  [phone]      ----- Message -----  From: EJ GEORGES  Sent: 9/6/18 9:11 AM  To: Jazmyn Sinclair  Subject: RE: Prescription Question    I've pended the script for Dr. Vermeesch to send in today.          ----- Message -----     From: Jazmyn Sinclair     Sent: 9/5/2018  8:22 PM EDT       To: Marilyn K. Vermeesch, MD  Subject: Prescription Question    this is my first time trying to use this for a refill, so if I do it incorrectly, please let me know and advise terry I'm new at this.   I am in need of a refill on my Adderall XR.   Please advise.  Thank you,  Jazmyn Sinclair

## 2020-04-22 DIAGNOSIS — F90.2 ATTENTION DEFICIT HYPERACTIVITY DISORDER (ADHD), COMBINED TYPE: ICD-10-CM

## 2020-04-22 RX ORDER — DEXTROAMPHETAMINE SACCHARATE, AMPHETAMINE ASPARTATE MONOHYDRATE, DEXTROAMPHETAMINE SULFATE AND AMPHETAMINE SULFATE 6.25; 6.25; 6.25; 6.25 MG/1; MG/1; MG/1; MG/1
25 CAPSULE, EXTENDED RELEASE ORAL EVERY MORNING
Qty: 30 CAPSULE | Refills: 0 | Status: SHIPPED | OUTPATIENT
Start: 2020-04-22 | End: 2020-06-08 | Stop reason: SDUPTHER

## 2020-05-12 RX ORDER — VENLAFAXINE HYDROCHLORIDE 75 MG/1
225 CAPSULE, EXTENDED RELEASE ORAL DAILY
Qty: 90 CAPSULE | Refills: 1 | Status: SHIPPED | OUTPATIENT
Start: 2020-05-12 | End: 2020-07-15 | Stop reason: SDUPTHER

## 2020-06-02 ENCOUNTER — OFFICE VISIT (OUTPATIENT)
Dept: INTERNAL MEDICINE | Facility: CLINIC | Age: 50
End: 2020-06-02

## 2020-06-02 VITALS
HEIGHT: 65 IN | HEART RATE: 108 BPM | BODY MASS INDEX: 26.16 KG/M2 | SYSTOLIC BLOOD PRESSURE: 126 MMHG | DIASTOLIC BLOOD PRESSURE: 80 MMHG | TEMPERATURE: 97.8 F | OXYGEN SATURATION: 97 % | WEIGHT: 157 LBS

## 2020-06-02 DIAGNOSIS — F41.9 ANXIETY AND DEPRESSION: Primary | ICD-10-CM

## 2020-06-02 DIAGNOSIS — F90.2 ATTENTION DEFICIT HYPERACTIVITY DISORDER (ADHD), COMBINED TYPE: ICD-10-CM

## 2020-06-02 DIAGNOSIS — F32.A ANXIETY AND DEPRESSION: Primary | ICD-10-CM

## 2020-06-02 DIAGNOSIS — K21.9 GASTROESOPHAGEAL REFLUX DISEASE WITHOUT ESOPHAGITIS: ICD-10-CM

## 2020-06-02 DIAGNOSIS — N93.9 VAGINA BLEEDING: ICD-10-CM

## 2020-06-02 PROCEDURE — 99214 OFFICE O/P EST MOD 30 MIN: CPT | Performed by: INTERNAL MEDICINE

## 2020-06-02 NOTE — PROGRESS NOTES
Chief Complaint   Patient presents with   • Follow-up     for anxiety, depression, and ADHD.      Subjective   Jazmyn Sinclair is a 49 y.o. female.     Here for follow up of anxiety and depression, gastroesophageal reflux disease, migraine headaches, anemia, chronic back pain status post multiple surgeries, ADHD.  Anxiety/depression- currently on effexor and lamictal.  She has had difficulty with anxiety thru the pandemic.  She is taking melatonin 10 mg to help with sleep and it is very helpful.  GERD- taking omeprazole and sxs are well controlled, she may have sxs 3 days a month  Migraines- she is taking imitrex as needed about 7 times a month.  She was previously on Topamax but this medication was discontinued per her pain clinic doctor  Chronic pain- managed per Dr Moreland.  He has not made any changes  ADHD- she remains on adderall and good control of sxs.  She has been on same dose of Adderall for many years after she was diagnosed with ADHD per her psychiatrist  Allergies- she is using flonase and zyrtec with good control of allergy symptoms at this time  HCM- last mammogram 2018.  She needs Hep A vaccine and shingrix.  She had colonoscopy 2018 and was told next one is 10 yrs.   She was seen by derm last week and told to mix triamcinolone with ceravae and her skin became infected.  She stopped this and her nose and forehead are improving.   She has been bleeding with intercourse for the past month.  She has pain in LLQ.  Has had a hysterectomy.  Wears a pad after sex.  Dr Mcgregor did her hysterectomy        The following portions of the patient's history were reviewed and updated as appropriate: allergies, current medications, past family history, past medical history, past social history, past surgical history and problem list.    Review of Systems   Respiratory: Negative for shortness of breath.    Cardiovascular: Negative for chest pain and palpitations.   Gastrointestinal: Negative for abdominal pain.         "Occasional GERD symptoms   Genitourinary: Positive for vaginal bleeding.   Musculoskeletal: Positive for arthralgias and back pain.   Skin: Positive for rash.        Skin rash over face from recent use of topical ointment per dermatologist   Allergic/Immunologic: Positive for environmental allergies.   Psychiatric/Behavioral: Positive for decreased concentration. Negative for dysphoric mood and sleep disturbance. The patient is nervous/anxious.        Objective   /80   Pulse 108   Temp 97.8 °F (36.6 °C)   Ht 165.1 cm (65\")   Wt 71.2 kg (157 lb)   LMP  (LMP Unknown)   SpO2 97%   BMI 26.13 kg/m²   Body mass index is 26.13 kg/m².  Physical Exam   Constitutional: She is oriented to person, place, and time. She appears well-developed and well-nourished. No distress.   Pleasant female, appears stated age, wearing a mask and in no distress today   HENT:   Head: Normocephalic and atraumatic.   Right Ear: External ear normal.   Left Ear: External ear normal.   Eyes: Pupils are equal, round, and reactive to light. Conjunctivae and EOM are normal.   Neck: Normal range of motion. Neck supple. No thyromegaly present.   Cardiovascular: Normal rate, regular rhythm, normal heart sounds and intact distal pulses.   No murmur heard.  No carotid bruits   Pulmonary/Chest: Effort normal and breath sounds normal. No respiratory distress. She has no wheezes.   Abdominal: Soft. Bowel sounds are normal. She exhibits no distension. There is no tenderness.   Musculoskeletal: Normal range of motion. She exhibits no edema.   Lymphadenopathy:     She has no cervical adenopathy.   Neurological: She is alert and oriented to person, place, and time. No cranial nerve deficit. Coordination normal.   Skin:   Multiple open wounds noted primarily over her nose, a few on central forehead and a few on cheeks, there is no discharge and no surrounding erythema   Psychiatric: She has a normal mood and affect. Her behavior is normal. Judgment and " thought content normal.   Nursing note and vitals reviewed.      Assessment/Plan   Jazmyn Sinclair is here today and the following problems have been addressed:      Jazmyn was seen today for follow-up.    Diagnoses and all orders for this visit:    Anxiety and depression    Gastroesophageal reflux disease without esophagitis    Attention deficit hyperactivity disorder (ADHD), combined type    Vagina bleeding  -     Ambulatory Referral to Gynecology    Continue Effexor and Lamictal for mood, she is currently doing fairly well  Continue omeprazole as needed for GERD symptoms  Continue Adderall for underlying attention deficit disorder, last urine drug screen was appropriate  Refer to gynecologist for vaginal bleeding, patient has had hysterectomy and this occurs only after intercourse    Return to clinic in 3 months for Medicare wellness with labs    Please note that portions of this note were completed with a voice recognition program.  Efforts were made to edit dictation, but occasionally words are mistranscribed.

## 2020-06-08 DIAGNOSIS — F90.2 ATTENTION DEFICIT HYPERACTIVITY DISORDER (ADHD), COMBINED TYPE: ICD-10-CM

## 2020-06-08 RX ORDER — DEXTROAMPHETAMINE SACCHARATE, AMPHETAMINE ASPARTATE MONOHYDRATE, DEXTROAMPHETAMINE SULFATE AND AMPHETAMINE SULFATE 6.25; 6.25; 6.25; 6.25 MG/1; MG/1; MG/1; MG/1
25 CAPSULE, EXTENDED RELEASE ORAL EVERY MORNING
Qty: 30 CAPSULE | Refills: 0 | Status: SHIPPED | OUTPATIENT
Start: 2020-06-08 | End: 2020-07-14 | Stop reason: SDUPTHER

## 2020-06-08 NOTE — TELEPHONE ENCOUNTER
----- Message from Jazmyn Sinclair sent at 6/8/2020  9:41 AM EDT -----  Regarding: Prescription Question  Contact: [phone]  The pharmacy said they still have not received my Adderall refill. It shouldld have been taken care of at my recent visit,  but was apparently overlooked. Please let me know when I can expect it to be ready, since I m out.    Thank you 4ur time & assistance.  Have a happy day :-)

## 2020-06-15 RX ORDER — FLUTICASONE PROPIONATE 50 MCG
2 SPRAY, SUSPENSION (ML) NASAL DAILY
Qty: 1 BOTTLE | Refills: 5 | Status: SHIPPED | OUTPATIENT
Start: 2020-06-15

## 2020-07-08 ENCOUNTER — LAB (OUTPATIENT)
Dept: LAB | Facility: HOSPITAL | Age: 50
End: 2020-07-08

## 2020-07-08 ENCOUNTER — OFFICE VISIT (OUTPATIENT)
Dept: OBSTETRICS AND GYNECOLOGY | Facility: CLINIC | Age: 50
End: 2020-07-08

## 2020-07-08 VITALS
DIASTOLIC BLOOD PRESSURE: 74 MMHG | HEIGHT: 65 IN | WEIGHT: 161 LBS | SYSTOLIC BLOOD PRESSURE: 126 MMHG | BODY MASS INDEX: 26.82 KG/M2

## 2020-07-08 DIAGNOSIS — R63.4 WEIGHT LOSS: ICD-10-CM

## 2020-07-08 DIAGNOSIS — R10.2 PELVIC PAIN: Primary | ICD-10-CM

## 2020-07-08 DIAGNOSIS — R19.7 DIARRHEA, UNSPECIFIED TYPE: ICD-10-CM

## 2020-07-08 DIAGNOSIS — N93.0 POSTCOITAL BLEEDING: ICD-10-CM

## 2020-07-08 DIAGNOSIS — N94.10 DYSPAREUNIA IN FEMALE: ICD-10-CM

## 2020-07-08 LAB
ALBUMIN SERPL-MCNC: 4 G/DL (ref 3.5–5.2)
ALBUMIN/GLOB SERPL: 1.6 G/DL
ALP SERPL-CCNC: 74 U/L (ref 39–117)
ALT SERPL W P-5'-P-CCNC: 13 U/L (ref 1–33)
ANION GAP SERPL CALCULATED.3IONS-SCNC: 9.3 MMOL/L (ref 5–15)
AST SERPL-CCNC: 18 U/L (ref 1–32)
BASOPHILS # BLD AUTO: 0.01 10*3/MM3 (ref 0–0.2)
BASOPHILS NFR BLD AUTO: 0.2 % (ref 0–1.5)
BILIRUB SERPL-MCNC: 0.2 MG/DL (ref 0–1.2)
BUN SERPL-MCNC: 12 MG/DL (ref 6–20)
BUN/CREAT SERPL: 17.9 (ref 7–25)
CALCIUM SPEC-SCNC: 9.7 MG/DL (ref 8.6–10.5)
CHLORIDE SERPL-SCNC: 104 MMOL/L (ref 98–107)
CO2 SERPL-SCNC: 26.7 MMOL/L (ref 22–29)
CREAT SERPL-MCNC: 0.67 MG/DL (ref 0.57–1)
DEPRECATED RDW RBC AUTO: 41.1 FL (ref 37–54)
EOSINOPHIL # BLD AUTO: 0.33 10*3/MM3 (ref 0–0.4)
EOSINOPHIL NFR BLD AUTO: 6.4 % (ref 0.3–6.2)
ERYTHROCYTE [DISTWIDTH] IN BLOOD BY AUTOMATED COUNT: 12.9 % (ref 12.3–15.4)
GFR SERPL CREATININE-BSD FRML MDRD: 92 ML/MIN/1.73
GLOBULIN UR ELPH-MCNC: 2.5 GM/DL
GLUCOSE SERPL-MCNC: 83 MG/DL (ref 65–99)
HCT VFR BLD AUTO: 33.6 % (ref 34–46.6)
HGB BLD-MCNC: 11.4 G/DL (ref 12–15.9)
IMM GRANULOCYTES # BLD AUTO: 0.01 10*3/MM3 (ref 0–0.05)
IMM GRANULOCYTES NFR BLD AUTO: 0.2 % (ref 0–0.5)
LYMPHOCYTES # BLD AUTO: 2.15 10*3/MM3 (ref 0.7–3.1)
LYMPHOCYTES NFR BLD AUTO: 41.4 % (ref 19.6–45.3)
MCH RBC QN AUTO: 29.7 PG (ref 26.6–33)
MCHC RBC AUTO-ENTMCNC: 33.9 G/DL (ref 31.5–35.7)
MCV RBC AUTO: 87.5 FL (ref 79–97)
MONOCYTES # BLD AUTO: 0.79 10*3/MM3 (ref 0.1–0.9)
MONOCYTES NFR BLD AUTO: 15.2 % (ref 5–12)
NEUTROPHILS NFR BLD AUTO: 1.9 10*3/MM3 (ref 1.7–7)
NEUTROPHILS NFR BLD AUTO: 36.6 % (ref 42.7–76)
NRBC BLD AUTO-RTO: 0 /100 WBC (ref 0–0.2)
PLATELET # BLD AUTO: 227 10*3/MM3 (ref 140–450)
PMV BLD AUTO: 10.6 FL (ref 6–12)
POTASSIUM SERPL-SCNC: 4 MMOL/L (ref 3.5–5.2)
PROT SERPL-MCNC: 6.5 G/DL (ref 6–8.5)
RBC # BLD AUTO: 3.84 10*6/MM3 (ref 3.77–5.28)
SODIUM SERPL-SCNC: 140 MMOL/L (ref 136–145)
T-UPTAKE NFR SERPL: 1.03 TBI (ref 0.8–1.3)
T4 SERPL-MCNC: 7.3 MCG/DL (ref 4.5–11.7)
TSH SERPL DL<=0.05 MIU/L-ACNC: 4.34 UIU/ML (ref 0.27–4.2)
WBC # BLD AUTO: 5.19 10*3/MM3 (ref 3.4–10.8)

## 2020-07-08 PROCEDURE — 84479 ASSAY OF THYROID (T3 OR T4): CPT | Performed by: OBSTETRICS & GYNECOLOGY

## 2020-07-08 PROCEDURE — 85025 COMPLETE CBC W/AUTO DIFF WBC: CPT | Performed by: OBSTETRICS & GYNECOLOGY

## 2020-07-08 PROCEDURE — 80053 COMPREHEN METABOLIC PANEL: CPT | Performed by: OBSTETRICS & GYNECOLOGY

## 2020-07-08 PROCEDURE — 36415 COLL VENOUS BLD VENIPUNCTURE: CPT | Performed by: OBSTETRICS & GYNECOLOGY

## 2020-07-08 PROCEDURE — 99214 OFFICE O/P EST MOD 30 MIN: CPT | Performed by: OBSTETRICS & GYNECOLOGY

## 2020-07-08 PROCEDURE — 84443 ASSAY THYROID STIM HORMONE: CPT | Performed by: OBSTETRICS & GYNECOLOGY

## 2020-07-08 PROCEDURE — 84436 ASSAY OF TOTAL THYROXINE: CPT | Performed by: OBSTETRICS & GYNECOLOGY

## 2020-07-08 RX ORDER — GABAPENTIN 600 MG/1
1800 TABLET ORAL NIGHTLY
COMMUNITY
Start: 2020-06-24

## 2020-07-08 RX ORDER — ESTRADIOL 10 UG/1
1 INSERT VAGINAL 2 TIMES WEEKLY
Qty: 8 TABLET | Refills: 11 | Status: SHIPPED | OUTPATIENT
Start: 2020-07-09
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
## 2020-07-14 DIAGNOSIS — F90.2 ATTENTION DEFICIT HYPERACTIVITY DISORDER (ADHD), COMBINED TYPE: ICD-10-CM

## 2020-07-14 RX ORDER — DEXTROAMPHETAMINE SACCHARATE, AMPHETAMINE ASPARTATE MONOHYDRATE, DEXTROAMPHETAMINE SULFATE AND AMPHETAMINE SULFATE 6.25; 6.25; 6.25; 6.25 MG/1; MG/1; MG/1; MG/1
25 CAPSULE, EXTENDED RELEASE ORAL EVERY MORNING
Qty: 30 CAPSULE | Refills: 0 | Status: SHIPPED | OUTPATIENT
Start: 2020-07-14 | End: 2020-08-20 | Stop reason: SDUPTHER

## 2020-07-14 NOTE — TELEPHONE ENCOUNTER
----- Message from Jazmyn Sinclair sent at 7/13/2020  8:54 PM EDT -----  Regarding: Prescription Question  Contact: [phone]  I need Rx for my Adderall. Please & thank you.

## 2020-07-15 RX ORDER — VENLAFAXINE HYDROCHLORIDE 75 MG/1
225 CAPSULE, EXTENDED RELEASE ORAL DAILY
Qty: 90 CAPSULE | Refills: 1 | Status: SHIPPED | OUTPATIENT
Start: 2020-07-15

## 2020-07-16 LAB
A VAGINAE DNA VAG QL NAA+PROBE: NORMAL SCORE
ADV 40+41 DNA STL QL NAA+NON-PROBE: NOT DETECTED
ASTRO TYP 1-8 RNA STL QL NAA+NON-PROBE: NOT DETECTED
BVAB2 DNA VAG QL NAA+PROBE: NORMAL SCORE
C ALBICANS DNA VAG QL NAA+PROBE: NEGATIVE
C CAYETANENSIS DNA STL QL NAA+NON-PROBE: NOT DETECTED
C GLABRATA DNA VAG QL NAA+PROBE: NEGATIVE
C TRACH DNA VAG QL NAA+PROBE: NEGATIVE
CAMPY SP DNA.DIARRHEA STL QL NAA+PROBE: NOT DETECTED
CRYPTOSP STL CULT: NOT DETECTED
E COLI DNA SPEC QL NAA+PROBE: NOT DETECTED
E HISTOLYT AG STL-ACNC: NOT DETECTED
EAEC PAA PLAS AGGR+AATA ST NAA+NON-PRB: NOT DETECTED
EC STX1 + STX2 GENES STL NAA+PROBE: NOT DETECTED
EPEC EAE GENE STL QL NAA+NON-PROBE: NOT DETECTED
ETEC LTA+ST1A+ST1B TOX ST NAA+NON-PROBE: NOT DETECTED
G LAMBLIA DNA SPEC QL NAA+PROBE: NOT DETECTED
HEMOCCULT STL QL: NEGATIVE
MEGA1 DNA VAG QL NAA+PROBE: NORMAL SCORE
N GONORRHOEA DNA VAG QL NAA+PROBE: NEGATIVE
NOROVIRUS GI+II RNA STL QL NAA+NON-PROBE: NOT DETECTED
P SHIGELLOIDES DNA STL QL NAA+PROBE: NOT DETECTED
RV RNA STL NAA+PROBE: NOT DETECTED
SALMONELLA DNA SPEC QL NAA+PROBE: NOT DETECTED
SAPO I+II+IV+V RNA STL QL NAA+NON-PROBE: NOT DETECTED
SHIGELLA SP+EIEC IPAH STL QL NAA+PROBE: NOT DETECTED
T VAGINALIS DNA VAG QL NAA+PROBE: NEGATIVE
V CHOLERAE DNA SPEC QL NAA+PROBE: NOT DETECTED
VIBRIO DNA SPEC NAA+PROBE: NOT DETECTED
YERSINIA STL CULT: NOT DETECTED

## 2020-07-16 PROCEDURE — 87045 FECES CULTURE AEROBIC BACT: CPT

## 2020-07-16 PROCEDURE — 87427 SHIGA-LIKE TOXIN AG IA: CPT

## 2020-07-16 PROCEDURE — 0097U HC BIOFIRE FILMARRAY GI PANEL: CPT

## 2020-07-16 PROCEDURE — 82272 OCCULT BLD FECES 1-3 TESTS: CPT

## 2020-07-16 PROCEDURE — 87046 STOOL CULTR AEROBIC BACT EA: CPT

## 2020-07-20 LAB
CAMPYLOBACTER STL CULT: NORMAL
E COLI SXT STL QL IA: NEGATIVE
Lab: NORMAL
Lab: NORMAL
SALM + SHIG STL CULT: NORMAL

## 2020-07-23 ENCOUNTER — OFFICE VISIT (OUTPATIENT)
Dept: INTERNAL MEDICINE | Facility: CLINIC | Age: 50
End: 2020-07-23

## 2020-07-23 VITALS
WEIGHT: 157 LBS | DIASTOLIC BLOOD PRESSURE: 72 MMHG | HEIGHT: 65 IN | HEART RATE: 79 BPM | BODY MASS INDEX: 26.16 KG/M2 | OXYGEN SATURATION: 99 % | SYSTOLIC BLOOD PRESSURE: 136 MMHG | TEMPERATURE: 97.3 F

## 2020-07-23 DIAGNOSIS — R94.6 ABNORMAL THYROID FUNCTION TEST: Primary | ICD-10-CM

## 2020-07-23 PROCEDURE — 99213 OFFICE O/P EST LOW 20 MIN: CPT | Performed by: INTERNAL MEDICINE

## 2020-07-23 RX ORDER — OMEPRAZOLE 20 MG/1
20 CAPSULE, DELAYED RELEASE ORAL DAILY
Qty: 30 CAPSULE | Refills: 12 | Status: SHIPPED | OUTPATIENT
Start: 2020-07-23

## 2020-07-23 RX ORDER — MELOXICAM 15 MG/1
TABLET ORAL
Qty: 30 TABLET | Refills: 5 | Status: SHIPPED | OUTPATIENT
Start: 2020-07-23 | End: 2020-10-01

## 2020-07-23 RX ORDER — CETIRIZINE HYDROCHLORIDE 10 MG/1
10 TABLET ORAL DAILY
Qty: 30 TABLET | Refills: 11 | Status: SHIPPED | OUTPATIENT
Start: 2020-07-23

## 2020-07-23 RX ORDER — GUANFACINE 2 MG/1
2 TABLET ORAL
Qty: 30 TABLET | Refills: 5 | Status: SHIPPED | OUTPATIENT
Start: 2020-07-23

## 2020-07-23 RX ORDER — LAMOTRIGINE 100 MG/1
100 TABLET ORAL DAILY
Qty: 30 TABLET | Refills: 5 | Status: SHIPPED | OUTPATIENT
Start: 2020-07-23

## 2020-07-23 NOTE — PROGRESS NOTES
"Chief Complaint   Patient presents with   • Abstract     discuss TSH labs     Subjective   Jazmyn Sinclair is a 50 y.o. female.     Here today to follow-up abnormal thyroid function test done by her gynecologist.  Her T4 and T uptake were normal, however TSH was slightly elevated at 4.34.  She has been losing weight unintentionally.  She has diarrhea.  No change in voice, no trouble swallowing.  She does have hair loss.    There is no FH of thyroid disease.  She has been taking biotin supplement and a MVI with biotin.  When she had her labs drawn she was taking both of these products.    She continues to have vaginal bleeding with sex.  She is seeing Dr Mcgregor for this and has had US that was negative.  A CT was ordered for further evaluation.  She has intermittent LLQ pain that is severe at times.  She has lost 13 lbs in past one yr.        The following portions of the patient's history were reviewed and updated as appropriate: allergies, current medications, past family history, past medical history, past social history, past surgical history and problem list.    Review of Systems   Constitutional: Positive for fatigue and unexpected weight change. Negative for activity change and appetite change.   HENT: Negative for trouble swallowing and voice change.    Gastrointestinal: Positive for diarrhea. Negative for constipation.   Endocrine: Negative for cold intolerance and heat intolerance.   Genitourinary: Positive for vaginal bleeding.   Psychiatric/Behavioral: Positive for sleep disturbance. Negative for dysphoric mood. The patient is not nervous/anxious.        Objective   /72   Pulse 79   Temp 97.3 °F (36.3 °C)   Ht 165.1 cm (65\")   Wt 71.2 kg (157 lb)   LMP  (LMP Unknown)   SpO2 99%   BMI 26.13 kg/m²   Body mass index is 26.13 kg/m².  Physical Exam   Constitutional: She is oriented to person, place, and time. She appears well-developed and well-nourished. No distress.   Pleasant female, appears her " age, wearing a mask and in no distress   HENT:   Head: Normocephalic and atraumatic.   Eyes: Pupils are equal, round, and reactive to light. EOM are normal. Right eye exhibits no discharge.   Neck: Normal range of motion. Neck supple. No thyromegaly present.   Patient states her lymph nodes are tender, however they are not palpable   Cardiovascular: Normal rate, regular rhythm and normal heart sounds.   No murmur heard.  Pulmonary/Chest: Effort normal and breath sounds normal. No respiratory distress.   Musculoskeletal: Normal range of motion. She exhibits no edema.   Lymphadenopathy:     She has no cervical adenopathy.   Neurological: She is alert and oriented to person, place, and time. No cranial nerve deficit. Coordination normal.   Psychiatric: She has a normal mood and affect. Her behavior is normal. Judgment and thought content normal.   Nursing note and vitals reviewed.      Assessment/Plan   Jazmyn Sinclair is here today and the following problems have been addressed:      Jazmyn was seen today for abstract.    Diagnoses and all orders for this visit:    Abnormal thyroid function test  -     TSH  -     T4, Free  -     T3, Uptake  -     Thyroglobulin  -     Thyroid Peroxidase Antibody  -     Thyroid Antibodies    stop biotin and MVI for one week prior to labs  Will contact pt with lab results and further plan of care  Follow up with GYN for vaginal bleeding    RTC as scheduled for medicare wellness    Please note that portions of this note were completed with a voice recognition program.  Efforts were made to edit dictation, but occasionally words are mistranscribed.

## 2020-07-24 ENCOUNTER — TELEPHONE (OUTPATIENT)
Dept: INTERNAL MEDICINE | Facility: CLINIC | Age: 50
End: 2020-07-24

## 2020-07-24 ENCOUNTER — HOSPITAL ENCOUNTER (OUTPATIENT)
Dept: CT IMAGING | Facility: HOSPITAL | Age: 50
Discharge: HOME | End: 2020-07-24

## 2020-07-24 ENCOUNTER — HOSPITAL ENCOUNTER (OUTPATIENT)
Dept: MAMMOGRAPHY | Facility: HOSPITAL | Age: 50
Discharge: HOME | End: 2020-07-24
Admitting: INTERNAL MEDICINE

## 2020-07-24 DIAGNOSIS — Z12.31 SCREENING MAMMOGRAM, ENCOUNTER FOR: ICD-10-CM

## 2020-07-24 DIAGNOSIS — R19.7 DIARRHEA, UNSPECIFIED TYPE: ICD-10-CM

## 2020-07-24 DIAGNOSIS — R11.2 INTRACTABLE VOMITING WITH NAUSEA, UNSPECIFIED VOMITING TYPE: ICD-10-CM

## 2020-07-24 DIAGNOSIS — R63.4 WEIGHT LOSS: ICD-10-CM

## 2020-07-24 DIAGNOSIS — R10.2 PELVIC PAIN: ICD-10-CM

## 2020-07-24 PROCEDURE — 25010000002 IOPAMIDOL 61 % SOLUTION: Performed by: OBSTETRICS & GYNECOLOGY

## 2020-07-24 PROCEDURE — 77063 BREAST TOMOSYNTHESIS BI: CPT

## 2020-07-24 PROCEDURE — 77067 SCR MAMMO BI INCL CAD: CPT

## 2020-07-24 PROCEDURE — 74177 CT ABD & PELVIS W/CONTRAST: CPT

## 2020-07-24 RX ORDER — POTASSIUM CHLORIDE 750 MG/1
10 CAPSULE, EXTENDED RELEASE ORAL 2 TIMES DAILY
Qty: 60 CAPSULE | Refills: 5 | Status: SHIPPED | OUTPATIENT
Start: 2020-07-24 | End: 2020-08-20 | Stop reason: SDUPTHER

## 2020-07-24 RX ORDER — ONDANSETRON 4 MG/1
4 TABLET, ORALLY DISINTEGRATING ORAL EVERY 8 HOURS PRN
Qty: 10 TABLET | Refills: 1 | Status: SHIPPED | OUTPATIENT
Start: 2020-07-24

## 2020-07-24 RX ADMIN — IOPAMIDOL 100 ML: 612 INJECTION, SOLUTION INTRAVENOUS at 12:40

## 2020-07-24 NOTE — TELEPHONE ENCOUNTER
----- Message from Marilyn K Vermeesch, MD sent at 2020 12:20 PM EDT -----  Regarding: FW: Prescription Question  Contact: [phone]  Yes, you may send in Zofran 4 mg every 8 hours as needed number 10 tablets with 1 refill    ----- Message -----  From: Isela Morgan MA  Sent: 2020  12:00 PM EDT  To: Marilyn K Vermeesch, MD  Subject: FW: Prescription Question                        Ok to send in?      ----- Message -----  From: Jazmyn Sinclair  Sent: 2020  11:49 AM EDT  To: Emigdio Montes [facility] [place]  Subject: Prescription Question                            Cld I plz get a refill on the Rx for Zofran? It was originally written by Mallory in 2019, & it has therefore .

## 2020-08-14 ENCOUNTER — TELEMEDICINE (OUTPATIENT)
Dept: FAMILY MEDICINE CLINIC | Facility: TELEHEALTH | Age: 50
End: 2020-08-14

## 2020-08-14 DIAGNOSIS — L23.7 POISON IVY DERMATITIS: Primary | ICD-10-CM

## 2020-08-14 PROCEDURE — 99213 OFFICE O/P EST LOW 20 MIN: CPT | Performed by: NURSE PRACTITIONER

## 2020-08-14 RX ORDER — PREDNISONE 10 MG/1
TABLET ORAL
Qty: 21 TABLET | Refills: 0 | Status: SHIPPED | OUTPATIENT
Start: 2020-08-14 | End: 2020-10-01
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
## 2020-08-20 DIAGNOSIS — F90.2 ATTENTION DEFICIT HYPERACTIVITY DISORDER (ADHD), COMBINED TYPE: ICD-10-CM

## 2020-08-20 RX ORDER — POTASSIUM CHLORIDE 750 MG/1
10 CAPSULE, EXTENDED RELEASE ORAL 2 TIMES DAILY
Qty: 60 CAPSULE | Refills: 5 | Status: SHIPPED | OUTPATIENT
Start: 2020-08-20

## 2020-08-20 RX ORDER — DEXTROAMPHETAMINE SACCHARATE, AMPHETAMINE ASPARTATE MONOHYDRATE, DEXTROAMPHETAMINE SULFATE AND AMPHETAMINE SULFATE 6.25; 6.25; 6.25; 6.25 MG/1; MG/1; MG/1; MG/1
25 CAPSULE, EXTENDED RELEASE ORAL EVERY MORNING
Qty: 30 CAPSULE | Refills: 0 | Status: SHIPPED | OUTPATIENT
Start: 2020-08-20

## 2020-08-20 NOTE — TELEPHONE ENCOUNTER
----- Message from Jazmyn Sinclair sent at 8/20/2020 10:15 AM EDT -----  Regarding: Prescription Question  Contact: [phone]  Hi. It's time for the prescription refill I must request every month... But for the first time ever, there's a twist :    I need my Adderall Rx refill for the month as usual.  Plus I ALSO need REFILLS added on\ to my prescription potassium (POT CHLORIDE). Please notify and as usual have a wonderful day! :-)

## 2020-08-24 DIAGNOSIS — R63.4 WEIGHT LOSS, UNINTENTIONAL: ICD-10-CM

## 2020-08-24 DIAGNOSIS — R10.32 LLQ PAIN: Primary | ICD-10-CM

## 2020-08-24 DIAGNOSIS — K21.9 GASTROESOPHAGEAL REFLUX DISEASE WITHOUT ESOPHAGITIS: ICD-10-CM

## 2020-08-26 LAB
T3RU NFR SERPL: 25 % (ref 24–39)
T4 FREE SERPL-MCNC: 1.16 NG/DL (ref 0.82–1.77)
THYROGLOB AB SERPL-ACNC: <1 IU/ML (ref 0–0.9)
THYROGLOB SERPL-MCNC: 13 NG/ML
THYROPEROXIDASE AB SERPL-ACNC: <9 IU/ML (ref 0–34)
TSH SERPL DL<=0.005 MIU/L-ACNC: 1.06 UIU/ML (ref 0.45–4.5)

## 2020-08-27 ENCOUNTER — TELEPHONE (OUTPATIENT)
Dept: INTERNAL MEDICINE | Facility: CLINIC | Age: 50
End: 2020-08-27

## 2020-10-01 ENCOUNTER — OFFICE VISIT (OUTPATIENT)
Dept: INTERNAL MEDICINE | Facility: CLINIC | Age: 50
End: 2020-10-01

## 2020-10-01 VITALS
SYSTOLIC BLOOD PRESSURE: 124 MMHG | HEIGHT: 65 IN | OXYGEN SATURATION: 97 % | BODY MASS INDEX: 27.49 KG/M2 | HEART RATE: 113 BPM | TEMPERATURE: 97.3 F | WEIGHT: 165 LBS | DIASTOLIC BLOOD PRESSURE: 68 MMHG

## 2020-10-01 DIAGNOSIS — Z00.00 ENCOUNTER FOR MEDICARE ANNUAL WELLNESS EXAM: Primary | ICD-10-CM

## 2020-10-01 DIAGNOSIS — M54.50 CHRONIC BILATERAL LOW BACK PAIN WITHOUT SCIATICA: ICD-10-CM

## 2020-10-01 DIAGNOSIS — G89.29 CHRONIC BILATERAL LOW BACK PAIN WITHOUT SCIATICA: ICD-10-CM

## 2020-10-01 DIAGNOSIS — F32.A ANXIETY AND DEPRESSION: ICD-10-CM

## 2020-10-01 DIAGNOSIS — K52.9 FREQUENT STOOLS: ICD-10-CM

## 2020-10-01 DIAGNOSIS — K21.9 GASTROESOPHAGEAL REFLUX DISEASE WITHOUT ESOPHAGITIS: ICD-10-CM

## 2020-10-01 DIAGNOSIS — F90.2 ATTENTION DEFICIT HYPERACTIVITY DISORDER (ADHD), COMBINED TYPE: ICD-10-CM

## 2020-10-01 DIAGNOSIS — F41.9 ANXIETY AND DEPRESSION: ICD-10-CM

## 2020-10-01 PROCEDURE — 90471 IMMUNIZATION ADMIN: CPT | Performed by: INTERNAL MEDICINE

## 2020-10-01 PROCEDURE — 90686 IIV4 VACC NO PRSV 0.5 ML IM: CPT | Performed by: INTERNAL MEDICINE

## 2020-10-01 PROCEDURE — G0439 PPPS, SUBSEQ VISIT: HCPCS | Performed by: INTERNAL MEDICINE

## 2020-10-01 RX ORDER — MELOXICAM 15 MG/1
7.5 TABLET ORAL DAILY
Qty: 30 TABLET | Refills: 5
Start: 2020-10-01

## 2020-11-18 ENCOUNTER — OFFICE VISIT (OUTPATIENT)
Dept: GASTROENTEROLOGY | Facility: CLINIC | Age: 50
End: 2020-11-18

## 2020-11-18 ENCOUNTER — LAB (OUTPATIENT)
Dept: LAB | Facility: HOSPITAL | Age: 50
End: 2020-11-18

## 2020-11-18 VITALS
WEIGHT: 157 LBS | SYSTOLIC BLOOD PRESSURE: 118 MMHG | HEART RATE: 102 BPM | RESPIRATION RATE: 12 BRPM | BODY MASS INDEX: 26.16 KG/M2 | DIASTOLIC BLOOD PRESSURE: 85 MMHG | HEIGHT: 65 IN | TEMPERATURE: 96.6 F

## 2020-11-18 DIAGNOSIS — D64.9 NORMOCYTIC ANEMIA: ICD-10-CM

## 2020-11-18 DIAGNOSIS — K21.9 GASTROESOPHAGEAL REFLUX DISEASE WITHOUT ESOPHAGITIS: Primary | ICD-10-CM

## 2020-11-18 DIAGNOSIS — R11.0 NAUSEA: ICD-10-CM

## 2020-11-18 DIAGNOSIS — Z80.0 FAMILY HX OF COLON CANCER: ICD-10-CM

## 2020-11-18 DIAGNOSIS — R10.84 GENERALIZED ABDOMINAL PAIN: ICD-10-CM

## 2020-11-18 DIAGNOSIS — Z79.1 LONG TERM (CURRENT) USE OF NON-STEROIDAL ANTI-INFLAMMATORIES (NSAID): ICD-10-CM

## 2020-11-18 DIAGNOSIS — A04.9 BACTERIAL INTESTINAL INFECTION, UNSPECIFIED: ICD-10-CM

## 2020-11-18 DIAGNOSIS — K52.9 CHRONIC DIARRHEA: ICD-10-CM

## 2020-11-18 DIAGNOSIS — R63.4 WEIGHT LOSS: ICD-10-CM

## 2020-11-18 PROCEDURE — 99204 OFFICE O/P NEW MOD 45 MIN: CPT | Performed by: INTERNAL MEDICINE

## 2020-11-18 PROCEDURE — 82728 ASSAY OF FERRITIN: CPT

## 2020-11-18 PROCEDURE — 83516 IMMUNOASSAY NONANTIBODY: CPT

## 2020-11-18 PROCEDURE — 83540 ASSAY OF IRON: CPT

## 2020-11-18 PROCEDURE — 82607 VITAMIN B-12: CPT

## 2020-11-18 PROCEDURE — 82746 ASSAY OF FOLIC ACID SERUM: CPT

## 2020-11-18 PROCEDURE — 36415 COLL VENOUS BLD VENIPUNCTURE: CPT | Performed by: INTERNAL MEDICINE

## 2020-11-18 PROCEDURE — 82784 ASSAY IGA/IGD/IGG/IGM EACH: CPT

## 2020-11-18 PROCEDURE — 86376 MICROSOMAL ANTIBODY EACH: CPT | Performed by: INTERNAL MEDICINE

## 2020-11-18 PROCEDURE — 84466 ASSAY OF TRANSFERRIN: CPT

## 2020-11-18 RX ORDER — SODIUM CHLORIDE 9 MG/ML
70 INJECTION, SOLUTION INTRAVENOUS CONTINUOUS PRN
Status: CANCELLED | OUTPATIENT
Start: 2020-11-18

## 2020-11-18 RX ORDER — DICYCLOMINE HCL 20 MG
20 TABLET ORAL 3 TIMES DAILY PRN
Qty: 60 TABLET | Refills: 1 | Status: SHIPPED | OUTPATIENT
Start: 2020-11-18

## 2020-11-18 NOTE — PATIENT INSTRUCTIONS
Low-FODMAP Eating Plan    FODMAPs (fermentable oligosaccharides, disaccharides, monosaccharides, and polyols) are sugars that are hard for some people to digest. A low-FODMAP eating plan may help some people who have bowel (intestinal) diseases to manage their symptoms.  This meal plan can be complicated to follow. Work with a diet and nutrition specialist (dietitian) to make a low-FODMAP eating plan that is right for you. A dietitian can make sure that you get enough nutrition from this diet.  What are tips for following this plan?  Reading food labels  · Check labels for hidden FODMAPs such as:  ? High-fructose syrup.  ? Honey.  ? Agave.  ? Natural fruit flavors.  ? Onion or garlic powder.  · Choose low-FODMAP foods that contain 3-4 grams of fiber per serving.  · Check food labels for serving sizes. Eat only one serving at a time to make sure FODMAP levels stay low.  Meal planning  · Follow a low-FODMAP eating plan for up to 6 weeks, or as told by your health care provider or dietitian.  · To follow the eating plan:  1. Eliminate high-FODMAP foods from your diet completely.  2. Gradually reintroduce high-FODMAP foods into your diet one at a time. Most people should wait a few days after introducing one high-FODMAP food before they introduce the next high-FODMAP food. Your dietitian can recommend how quickly you may reintroduce foods.  3. Keep a daily record of what you eat and drink, and make note of any symptoms that you have after eating.  4. Review your daily record with a dietitian regularly. Your dietitian can help you identify which foods you can eat and which foods you should avoid.  General tips  · Drink enough fluid each day to keep your urine pale yellow.  · Avoid processed foods. These often have added sugar and may be high in FODMAPs.  · Avoid most dairy products, whole grains, and sweeteners.  · Work with a dietitian to make sure you get enough fiber in your diet.  Recommended  "foods  Grains  · Gluten-free grains, such as rice, oats, buckwheat, quinoa, corn, polenta, and millet. Gluten-free pasta, bread, or cereal. Rice noodles. Corn tortillas.  Vegetables  · Eggplant, zucchini, cucumber, peppers, green beans, [place] sprouts, bean sprouts, lettuce, arugula, kale, Swiss chard, spinach, blake greens, bok vanessa, summer squash, potato, and tomato. Limited amounts of corn, carrot, and sweet potato. Green parts of scallions.  Fruits  · Bananas, oranges, geeta, limes, blueberries, raspberries, strawberries, grapes, cantaloupe, honeydew melon, kiwi, papaya, passion fruit, and pineapple. Limited amounts of dried cranberries, banana chips, and shredded coconut.  Dairy  · Lactose-free milk, yogurt, and kefir. Lactose-free cottage cheese and ice cream. Non-dairy milks, such as almond, coconut, hemp, and rice milk. Yogurts made of non-dairy milks. Limited amounts of goat cheese, brie, mozzarella, parmesan, swiss, and other hard cheeses.  Meats and other protein foods  · Unseasoned beef, pork, poultry, or fish. Eggs. Rod. Tofu (firm) and tempeh. Limited amounts of nuts and seeds, such as almonds, walnuts, brazil nuts, pecans, peanuts, pumpkin seeds, alessio seeds, and sunflower seeds.  Fats and oils  · Butter-free spreads. Vegetable oils, such as olive, canola, and sunflower oil.  Seasoning and other foods  · Artificial sweeteners with names that do not end in \"ol\" such as aspartame, saccharine, and stevia. Maple syrup, white table sugar, raw sugar, brown sugar, and molasses. Fresh basil, coriander, parsley, rosemary, and thyme.  Beverages  · Water and mineral water. Sugar-sweetened soft drinks. Small amounts of orange juice or cranberry juice. Black and green tea. Most dry kam. Coffee.  This may not be a complete list of low-FODMAP foods. Talk with your dietitian for more information.  Foods to avoid  Grains  · Wheat, including kamut, durum, and semolina. Barley and bulgur. Couscous. Wheat-based " cereals. Wheat noodles, bread, crackers, and pastries.  Vegetables  · Chicory root, artichoke, asparagus, cabbage, snow peas, sugar snap peas, mushrooms, and cauliflower. Onions, garlic, leeks, and the white part of scallions.  Fruits  · Fresh, dried, and juiced forms of apple, pear, watermelon, peach, plum, cherries, apricots, blackberries, boysenberries, figs, nectarines, and ok. Avocado.  Dairy  · Milk, yogurt, ice cream, and soft cheese. Cream and sour cream. Milk-based sauces. Custard.  Meats and other protein foods  · Fried or fatty meat. Sausage. Cashews and pistachios. Soybeans, baked beans, black beans, chickpeas, kidney beans, nathaniel beans, navy beans, lentils, and split peas.  Seasoning and other foods  · Any sugar-free gum or candy. Foods that contain artificial sweeteners such as sorbitol, mannitol, isomalt, or xylitol. Foods that contain honey, high-fructose corn syrup, or agave. Bouillon, vegetable stock, beef stock, and chicken stock. Garlic and onion powder. Condiments made with onion, such as hummus, chutney, pickles, relish, salad dressing, and salsa. Tomato paste.  Beverages  · Chicory-based drinks. Coffee substitutes. Chamomile tea. Fennel tea. Sweet or fortified kam such as port or winsome. Diet soft drinks made with isomalt, mannitol, maltitol, sorbitol, or xylitol. Apple, pear, and ok juice. Juices with high-fructose corn syrup.  This may not be a complete list of high-FODMAP foods. Talk with your dietitian to discuss what dietary choices are best for you.   Summary  · A low-FODMAP eating plan is a short-term diet that eliminates FODMAPs from your diet to help ease symptoms of certain bowel diseases.  · The eating plan usually lasts up to 6 weeks. After that, high-FODMAP foods are restarted gradually, one at a time, so you can find out which may be causing symptoms.  · A low-FODMAP eating plan can be complicated. It is best to work with a dietitian who has experience with this type of  plan.  This information is not intended to replace advice given to you by your health care provider. Make sure you discuss any questions you have with your health care provider.  Document Released: 08/14/2018 Document Revised: 11/30/2018 Document Reviewed: 08/14/2018  Elsevier Patient Education © 2020 [employer]
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
## 2020-11-19 LAB
FERRITIN SERPL-MCNC: 106 NG/ML (ref 13–150)
FOLATE SERPL-MCNC: >20 NG/ML (ref 4.78–24.2)
IGA1 MFR SER: 121 MG/DL (ref 70–400)
IRON 24H UR-MRATE: 69 MCG/DL (ref 37–145)
IRON SATN MFR SERPL: 18 % (ref 20–50)
TIBC SERPL-MCNC: 381 MCG/DL (ref 298–536)
TRANSFERRIN SERPL-MCNC: 256 MG/DL (ref 200–360)
TTG IGA SER-ACNC: <2 U/ML (ref 0–3)
VIT B12 BLD-MCNC: 742 PG/ML (ref 211–946)

## 2020-12-01 PROBLEM — R63.4 WEIGHT LOSS: Status: ACTIVE | Noted: 2020-12-01

## 2020-12-01 PROBLEM — R11.0 NAUSEA: Status: ACTIVE | Noted: 2020-12-01

## 2020-12-01 PROBLEM — D64.9 NORMOCYTIC ANEMIA: Status: ACTIVE | Noted: 2020-12-01

## 2020-12-01 PROBLEM — K52.9 CHRONIC DIARRHEA: Status: ACTIVE | Noted: 2020-12-01

## 2020-12-03 DIAGNOSIS — Z01.818 PREOP TESTING: Primary | ICD-10-CM

## 2020-12-22 LAB
ADV 40+41 DNA STL QL NAA+NON-PROBE: NOT DETECTED
ASTRO TYP 1-8 RNA STL QL NAA+NON-PROBE: NOT DETECTED
C CAYETANENSIS DNA STL QL NAA+NON-PROBE: NOT DETECTED
C DIFF TOX GENS STL QL NAA+PROBE: NOT DETECTED
CAMPY SP DNA.DIARRHEA STL QL NAA+PROBE: NOT DETECTED
CRYPTOSP STL CULT: NOT DETECTED
E HISTOLYT AG STL-ACNC: NOT DETECTED
EAEC PAA PLAS AGGR+AATA ST NAA+NON-PRB: NOT DETECTED
EC STX1 + STX2 GENES STL NAA+PROBE: NOT DETECTED
EPEC EAE GENE STL QL NAA+NON-PROBE: NOT DETECTED
ETEC LTA+ST1A+ST1B TOX ST NAA+NON-PROBE: NOT DETECTED
G LAMBLIA DNA SPEC QL NAA+PROBE: NOT DETECTED
NOROVIRUS GI+II RNA STL QL NAA+NON-PROBE: NOT DETECTED
P SHIGELLOIDES DNA STL QL NAA+PROBE: NOT DETECTED
RV RNA STL NAA+PROBE: NOT DETECTED
SALMONELLA DNA SPEC QL NAA+PROBE: NOT DETECTED
SAPO I+II+IV+V RNA STL QL NAA+NON-PROBE: NOT DETECTED
SHIGELLA SP+EIEC IPAH STL QL NAA+PROBE: NOT DETECTED
V CHOLERAE DNA SPEC QL NAA+PROBE: NOT DETECTED
VIBRIO DNA SPEC NAA+PROBE: NOT DETECTED
YERSINIA STL CULT: NOT DETECTED

## 2020-12-22 PROCEDURE — 0097U HC BIOFIRE FILMARRAY GI PANEL: CPT

## 2020-12-22 PROCEDURE — 87493 C DIFF AMPLIFIED PROBE: CPT

## 2021-01-25 ENCOUNTER — LAB (OUTPATIENT)
Dept: LAB | Facility: HOSPITAL | Age: 51
End: 2021-01-25

## 2021-01-25 DIAGNOSIS — Z01.818 PREOP TESTING: ICD-10-CM

## 2021-01-25 PROCEDURE — C9803 HOPD COVID-19 SPEC COLLECT: HCPCS

## 2021-01-25 PROCEDURE — U0004 COV-19 TEST NON-CDC HGH THRU: HCPCS

## 2021-01-26 LAB — SARS-COV-2 RNA RESP QL NAA+PROBE: NOT DETECTED

## 2021-01-28 ENCOUNTER — ANESTHESIA EVENT (OUTPATIENT)
Dept: GASTROENTEROLOGY | Facility: HOSPITAL | Age: 51
End: 2021-01-28

## 2021-01-28 ENCOUNTER — HOSPITAL ENCOUNTER (OUTPATIENT)
Facility: HOSPITAL | Age: 51
Setting detail: HOSPITAL OUTPATIENT SURGERY
Discharge: HOME | End: 2021-01-28
Attending: INTERNAL MEDICINE | Admitting: INTERNAL MEDICINE

## 2021-01-28 ENCOUNTER — ANESTHESIA (OUTPATIENT)
Dept: GASTROENTEROLOGY | Facility: HOSPITAL | Age: 51
End: 2021-01-28

## 2021-01-28 VITALS
RESPIRATION RATE: 17 BRPM | SYSTOLIC BLOOD PRESSURE: 99 MMHG | BODY MASS INDEX: 25.83 KG/M2 | HEART RATE: 82 BPM | TEMPERATURE: 98.9 F | OXYGEN SATURATION: 95 % | HEIGHT: 65 IN | WEIGHT: 155 LBS | DIASTOLIC BLOOD PRESSURE: 54 MMHG

## 2021-01-28 DIAGNOSIS — K52.9 CHRONIC DIARRHEA: ICD-10-CM

## 2021-01-28 DIAGNOSIS — R63.4 WEIGHT LOSS: ICD-10-CM

## 2021-01-28 DIAGNOSIS — D64.9 NORMOCYTIC ANEMIA: ICD-10-CM

## 2021-01-28 DIAGNOSIS — R11.0 NAUSEA: ICD-10-CM

## 2021-01-28 PROCEDURE — 25010000002 PROPOFOL 10 MG/ML EMULSION: Performed by: NURSE ANESTHETIST, CERTIFIED REGISTERED

## 2021-01-28 PROCEDURE — 25010000002 MIDAZOLAM PER 1MG: Performed by: NURSE ANESTHETIST, CERTIFIED REGISTERED

## 2021-01-28 PROCEDURE — 25010000002 HALOPERIDOL LACTATE PER 5 MG: Performed by: NURSE ANESTHETIST, CERTIFIED REGISTERED

## 2021-01-28 PROCEDURE — 25010000002 FENTANYL CITRATE (PF) 100 MCG/2ML SOLUTION: Performed by: NURSE ANESTHETIST, CERTIFIED REGISTERED

## 2021-01-28 PROCEDURE — 43239 EGD BIOPSY SINGLE/MULTIPLE: CPT | Performed by: INTERNAL MEDICINE

## 2021-01-28 PROCEDURE — 45380 COLONOSCOPY AND BIOPSY: CPT | Performed by: INTERNAL MEDICINE

## 2021-01-28 RX ORDER — SODIUM CHLORIDE 9 MG/ML
INJECTION, SOLUTION INTRAVENOUS CONTINUOUS PRN
Status: DISCONTINUED | OUTPATIENT
Start: 2021-01-28 | End: 2021-01-28 | Stop reason: SURG

## 2021-01-28 RX ORDER — SODIUM CHLORIDE 9 MG/ML
70 INJECTION, SOLUTION INTRAVENOUS CONTINUOUS PRN
Status: DISCONTINUED | OUTPATIENT
Start: 2021-01-28 | End: 2021-01-28 | Stop reason: HOSPADM

## 2021-01-28 RX ORDER — MAGNESIUM HYDROXIDE 1200 MG/15ML
LIQUID ORAL AS NEEDED
Status: DISCONTINUED | OUTPATIENT
Start: 2021-01-28 | End: 2021-01-28 | Stop reason: HOSPADM

## 2021-01-28 RX ORDER — KETAMINE HCL IN NACL, ISO-OSM 100MG/10ML
SYRINGE (ML) INJECTION AS NEEDED
Status: DISCONTINUED | OUTPATIENT
Start: 2021-01-28 | End: 2021-01-28 | Stop reason: SURG

## 2021-01-28 RX ORDER — PROPOFOL 10 MG/ML
VIAL (ML) INTRAVENOUS AS NEEDED
Status: DISCONTINUED | OUTPATIENT
Start: 2021-01-28 | End: 2021-01-28 | Stop reason: SURG

## 2021-01-28 RX ORDER — FENTANYL CITRATE 50 UG/ML
INJECTION, SOLUTION INTRAMUSCULAR; INTRAVENOUS AS NEEDED
Status: DISCONTINUED | OUTPATIENT
Start: 2021-01-28 | End: 2021-01-28 | Stop reason: SURG

## 2021-01-28 RX ORDER — MIDAZOLAM HYDROCHLORIDE 2 MG/2ML
INJECTION, SOLUTION INTRAMUSCULAR; INTRAVENOUS AS NEEDED
Status: DISCONTINUED | OUTPATIENT
Start: 2021-01-28 | End: 2021-01-28 | Stop reason: SURG

## 2021-01-28 RX ORDER — LIDOCAINE HYDROCHLORIDE 20 MG/ML
INJECTION, SOLUTION INTRAVENOUS AS NEEDED
Status: DISCONTINUED | OUTPATIENT
Start: 2021-01-28 | End: 2021-01-28 | Stop reason: SURG

## 2021-01-28 RX ORDER — HALOPERIDOL 5 MG/ML
INJECTION INTRAMUSCULAR AS NEEDED
Status: DISCONTINUED | OUTPATIENT
Start: 2021-01-28 | End: 2021-01-28 | Stop reason: SURG

## 2021-01-28 RX ORDER — NALOXEGOL OXALATE 25 MG/1
25 TABLET, FILM COATED ORAL EVERY MORNING
Qty: 30 TABLET | Refills: 2 | Status: SHIPPED | OUTPATIENT
Start: 2021-01-28

## 2021-01-28 RX ADMIN — MIDAZOLAM HYDROCHLORIDE 2 MG: 1 INJECTION, SOLUTION INTRAMUSCULAR; INTRAVENOUS at 11:27

## 2021-01-28 RX ADMIN — Medication 25 MG: at 11:28

## 2021-01-28 RX ADMIN — SODIUM CHLORIDE: 9 INJECTION, SOLUTION INTRAVENOUS at 11:15

## 2021-01-28 RX ADMIN — FENTANYL CITRATE 50 MCG: 50 INJECTION, SOLUTION INTRAMUSCULAR; INTRAVENOUS at 11:41

## 2021-01-28 RX ADMIN — PROPOFOL 30 MG: 10 INJECTION, EMULSION INTRAVENOUS at 11:39

## 2021-01-28 RX ADMIN — PROPOFOL 30 MG: 10 INJECTION, EMULSION INTRAVENOUS at 11:41

## 2021-01-28 RX ADMIN — Medication 25 MG: at 11:42

## 2021-01-28 RX ADMIN — PROPOFOL 30 MG: 10 INJECTION, EMULSION INTRAVENOUS at 11:37

## 2021-01-28 RX ADMIN — SODIUM CHLORIDE 70 ML/HR: 9 INJECTION, SOLUTION INTRAVENOUS at 11:09

## 2021-01-28 RX ADMIN — HALOPERIDOL LACTATE 0.5 MG: 5 INJECTION, SOLUTION INTRAMUSCULAR at 11:34

## 2021-01-28 RX ADMIN — PROPOFOL 30 MG: 10 INJECTION, EMULSION INTRAVENOUS at 11:35

## 2021-01-28 RX ADMIN — PROPOFOL 30 MG: 10 INJECTION, EMULSION INTRAVENOUS at 11:44

## 2021-01-28 RX ADMIN — PROPOFOL 50 MG: 10 INJECTION, EMULSION INTRAVENOUS at 11:32

## 2021-01-28 RX ADMIN — FENTANYL CITRATE 50 MCG: 50 INJECTION, SOLUTION INTRAMUSCULAR; INTRAVENOUS at 11:28

## 2021-01-28 RX ADMIN — LIDOCAINE HYDROCHLORIDE 40 MG: 20 INJECTION, SOLUTION INTRAVENOUS at 11:27

## 2021-01-28 NOTE — ANESTHESIA POSTPROCEDURE EVALUATION
Patient: Jazmyn Sinclair    Procedure Summary     Date: 01/28/21 Room / Location: [facility] ENDOSCOPY 2 / [facility] ENDOSCOPY    Anesthesia Start: 1124 Anesthesia Stop:     Procedures:       ESOPHAGOGASTRODUODENOSCOPY WITH BIOPSY (N/A Esophagus)      COLONOSCOPY WITH BIOPSY. Aborted exam due to poor prep (N/A Anus) Diagnosis:       Chronic diarrhea      Nausea      Normocytic anemia      Weight loss      (Chronic diarrhea [K52.9])      (Nausea [R11.0])      (Normocytic anemia [D64.9])      (Weight loss [R63.4])    Surgeon: Lonny Maguire MD Provider: Guero Hernandez CRNA    Anesthesia Type: MAC ASA Status: 3          Anesthesia Type: MAC    Vitals  No vitals data found for the desired time range.          Post Anesthesia Care and Evaluation    Patient location during evaluation: PHASE II  Patient participation: complete - patient participated  Level of consciousness: awake  Pain score: 0  Pain management: adequate  Airway patency: patent  Anesthetic complications: No anesthetic complications  PONV Status: none  Cardiovascular status: acceptable  Respiratory status: acceptable and nasal cannula  Hydration status: acceptable    Comments: vsss resp spont, reflexes intact, responsive, report given to pacu nurse
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
## 2021-01-30 LAB
LAB AP CASE REPORT: NORMAL
PATH REPORT.FINAL DX SPEC: NORMAL

## 2021-03-29 ENCOUNTER — TRANSCRIBE ORDERS (OUTPATIENT)
Dept: ADMINISTRATIVE | Facility: HOSPITAL | Age: 51
End: 2021-03-29

## 2021-03-29 DIAGNOSIS — Z12.31 VISIT FOR SCREENING MAMMOGRAM: Primary | ICD-10-CM

## 2021-03-29 DIAGNOSIS — Z78.0 POST-MENOPAUSE: Primary | ICD-10-CM

## 2021-07-26 ENCOUNTER — APPOINTMENT (OUTPATIENT)
Dept: BONE DENSITY | Facility: HOSPITAL | Age: 51
End: 2021-07-26

## 2022-01-20 PROCEDURE — U0004 COV-19 TEST NON-CDC HGH THRU: HCPCS | Performed by: NURSE PRACTITIONER

## 2022-02-24 PROCEDURE — U0004 COV-19 TEST NON-CDC HGH THRU: HCPCS | Performed by: NURSE PRACTITIONER

## 2022-03-17 ENCOUNTER — TRANSCRIBE ORDERS (OUTPATIENT)
Dept: ADMINISTRATIVE | Facility: HOSPITAL | Age: 52
End: 2022-03-17

## 2022-03-17 DIAGNOSIS — Z12.31 VISIT FOR SCREENING MAMMOGRAM: Primary | ICD-10-CM

## 2022-03-17 DIAGNOSIS — Z12.39 ENCOUNTER FOR SCREENING BREAST EXAMINATION: ICD-10-CM

## 2022-03-17 DIAGNOSIS — Z78.0 POSTMENOPAUSE: ICD-10-CM

## 2022-03-17 DIAGNOSIS — Z12.39 BREAST SCREENING: ICD-10-CM

## 2022-11-29 ENCOUNTER — TRANSCRIBE ORDERS (OUTPATIENT)
Dept: LAB | Facility: HOSPITAL | Age: 52
End: 2022-11-29

## 2022-11-29 ENCOUNTER — LAB (OUTPATIENT)
Dept: LAB | Facility: HOSPITAL | Age: 52
End: 2022-11-29

## 2022-11-29 DIAGNOSIS — Z79.899 ENCOUNTER FOR LONG-TERM (CURRENT) USE OF OTHER MEDICATIONS: ICD-10-CM

## 2022-11-29 DIAGNOSIS — Z79.899 ENCOUNTER FOR LONG-TERM (CURRENT) USE OF OTHER MEDICATIONS: Primary | ICD-10-CM

## 2022-11-29 LAB
AMPHET+METHAMPHET UR QL: POSITIVE
AMPHETAMINES UR QL: NEGATIVE
BARBITURATES UR QL SCN: NEGATIVE
BENZODIAZ UR QL SCN: NEGATIVE
BUPRENORPHINE SERPL-MCNC: NEGATIVE NG/ML
CANNABINOIDS SERPL QL: NEGATIVE
COCAINE UR QL: NEGATIVE
METHADONE UR QL SCN: NEGATIVE
OPIATES UR QL: NEGATIVE
OXYCODONE UR QL SCN: NEGATIVE
PCP UR QL SCN: NEGATIVE
PROPOXYPH UR QL: NEGATIVE
TRICYCLICS UR QL SCN: NEGATIVE

## 2022-11-29 PROCEDURE — 80306 DRUG TEST PRSMV INSTRMNT: CPT

## 2023-02-13 ENCOUNTER — APPOINTMENT (OUTPATIENT)
Dept: BONE DENSITY | Facility: HOSPITAL | Age: 53
End: 2023-02-13
Payer: MEDICARE

## 2023-02-13 ENCOUNTER — HOSPITAL ENCOUNTER (OUTPATIENT)
Dept: MAMMOGRAPHY | Facility: HOSPITAL | Age: 53
Discharge: HOME | End: 2023-02-13
Payer: MEDICARE

## 2023-02-13 DIAGNOSIS — Z78.0 POSTMENOPAUSE: ICD-10-CM

## 2023-02-13 DIAGNOSIS — Z12.31 VISIT FOR SCREENING MAMMOGRAM: ICD-10-CM

## 2023-02-13 PROCEDURE — 77063 BREAST TOMOSYNTHESIS BI: CPT

## 2023-02-13 PROCEDURE — 77067 SCR MAMMO BI INCL CAD: CPT

## 2023-02-13 PROCEDURE — 77080 DXA BONE DENSITY AXIAL: CPT

## 2023-05-17 ENCOUNTER — HOSPITAL ENCOUNTER (OUTPATIENT)
Dept: ULTRASOUND IMAGING | Facility: HOSPITAL | Age: 53
Discharge: HOME | End: 2023-05-17
Payer: MEDICARE

## 2023-05-17 ENCOUNTER — HOSPITAL ENCOUNTER (OUTPATIENT)
Dept: MAMMOGRAPHY | Facility: HOSPITAL | Age: 53
Discharge: HOME | End: 2023-05-17
Payer: MEDICARE

## 2023-05-17 DIAGNOSIS — R92.8 ABNORMAL MAMMOGRAM OF RIGHT BREAST: ICD-10-CM

## 2023-05-17 PROCEDURE — 77065 DX MAMMO INCL CAD UNI: CPT

## 2023-05-17 PROCEDURE — G0279 TOMOSYNTHESIS, MAMMO: HCPCS

## 2023-09-21 NOTE — TELEPHONE ENCOUNTER
After obtaining written consent from patient and per orders of Dr. Devorah Sims, injection of Fluad administered Intramuscular in Left deltoid by Norberto Burleson. Patient tolerated well with no immediate reactions noted in office. Left detailed VM that script is ready for

## 2023-11-13 ENCOUNTER — OFFICE VISIT (OUTPATIENT)
Dept: PSYCHIATRY | Facility: CLINIC | Age: 53
End: 2023-11-13
Payer: MEDICARE

## 2023-11-13 VITALS
SYSTOLIC BLOOD PRESSURE: 122 MMHG | BODY MASS INDEX: 25.62 KG/M2 | HEIGHT: 65 IN | DIASTOLIC BLOOD PRESSURE: 84 MMHG | WEIGHT: 153.8 LBS | HEART RATE: 100 BPM | OXYGEN SATURATION: 97 %

## 2023-11-13 DIAGNOSIS — F33.2 SEVERE EPISODE OF RECURRENT MAJOR DEPRESSIVE DISORDER, WITHOUT PSYCHOTIC FEATURES: ICD-10-CM

## 2023-11-13 DIAGNOSIS — F41.1 GENERALIZED ANXIETY DISORDER: Primary | ICD-10-CM

## 2023-11-13 PROCEDURE — 90792 PSYCH DIAG EVAL W/MED SRVCS: CPT | Performed by: NURSE PRACTITIONER

## 2023-11-13 RX ORDER — BREXPIPRAZOLE 0.5 MG/1
0.5 TABLET ORAL DAILY
Qty: 30 TABLET | Refills: 0 | COMMUNITY
Start: 2023-11-13

## 2023-11-13 RX ORDER — LISDEXAMFETAMINE DIMESYLATE CAPSULES 20 MG/1
CAPSULE ORAL
COMMUNITY

## 2023-11-13 RX ORDER — LAMOTRIGINE 100 MG/1
1 TABLET ORAL DAILY
COMMUNITY
Start: 2023-11-02

## 2023-11-13 RX ORDER — CETIRIZINE HYDROCHLORIDE 10 MG/1
1 TABLET ORAL DAILY
COMMUNITY
Start: 2023-10-04

## 2023-11-13 RX ORDER — CELECOXIB 100 MG/1
100 CAPSULE ORAL 2 TIMES DAILY PRN
COMMUNITY
End: 2023-11-13

## 2023-11-13 RX ORDER — CELECOXIB 200 MG/1
200 CAPSULE ORAL DAILY
COMMUNITY
Start: 2023-08-16

## 2023-11-13 RX ORDER — VENLAFAXINE HYDROCHLORIDE 150 MG/1
150 CAPSULE, EXTENDED RELEASE ORAL DAILY
COMMUNITY

## 2023-11-13 RX ORDER — PREGABALIN 75 MG/1
75 CAPSULE ORAL 2 TIMES DAILY
COMMUNITY
End: 2023-11-13

## 2023-11-13 RX ORDER — NAPROXEN 500 MG/1
500 TABLET ORAL 2 TIMES DAILY
COMMUNITY

## 2023-11-13 RX ORDER — DOXYCYCLINE 100 MG/1
TABLET ORAL
COMMUNITY
Start: 2023-10-11

## 2023-11-13 RX ORDER — FLUTICASONE PROPIONATE 50 MCG
2 SPRAY, SUSPENSION (ML) NASAL DAILY
COMMUNITY
Start: 2023-10-04

## 2023-11-13 RX ORDER — GUANFACINE 2 MG/1
1 TABLET ORAL EVERY EVENING
COMMUNITY
Start: 2023-10-04

## 2023-11-13 RX ORDER — OXYCODONE AND ACETAMINOPHEN 10; 325 MG/1; MG/1
1 TABLET ORAL
COMMUNITY
Start: 2023-06-26

## 2023-11-13 RX ORDER — OMEGA-3/DHA/EPA/FISH OIL 300-1000MG
CAPSULE ORAL DAILY
COMMUNITY

## 2023-11-13 RX ORDER — VIT C/B6/B5/MAGNESIUM/HERB 173 50-5-6-5MG
CAPSULE ORAL DAILY
COMMUNITY

## 2023-11-13 RX ORDER — DICYCLOMINE HCL 20 MG
1 TABLET ORAL 3 TIMES DAILY PRN
COMMUNITY
Start: 2023-10-04

## 2023-11-13 RX ORDER — TIZANIDINE 4 MG/1
2 TABLET ORAL NIGHTLY
COMMUNITY
Start: 2023-10-04

## 2023-11-13 RX ORDER — POTASSIUM CHLORIDE 750 MG/1
2 CAPSULE, EXTENDED RELEASE ORAL DAILY
COMMUNITY
Start: 2023-10-04

## 2023-11-13 RX ORDER — PREGABALIN 150 MG/1
150 CAPSULE ORAL EVERY 12 HOURS
COMMUNITY
Start: 2023-11-09

## 2023-11-13 RX ORDER — UREA 10 %
10 LOTION (ML) TOPICAL
COMMUNITY

## 2023-11-13 RX ORDER — NALOXONE HYDROCHLORIDE 4 MG/.1ML
SPRAY NASAL
COMMUNITY
Start: 2023-06-26 | End: 2024-06-25

## 2023-11-13 RX ORDER — DOXYCYCLINE 100 MG/1
1 CAPSULE ORAL EVERY 12 HOURS SCHEDULED
COMMUNITY
Start: 2023-10-26
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
## 2023-12-28 ENCOUNTER — TELEMEDICINE (OUTPATIENT)
Dept: PSYCHIATRY | Facility: CLINIC | Age: 53
End: 2023-12-28
Payer: MEDICARE

## 2023-12-28 DIAGNOSIS — F33.2 SEVERE EPISODE OF RECURRENT MAJOR DEPRESSIVE DISORDER, WITHOUT PSYCHOTIC FEATURES: ICD-10-CM

## 2023-12-28 DIAGNOSIS — F41.1 GENERALIZED ANXIETY DISORDER: Primary | ICD-10-CM

## 2023-12-28 RX ORDER — BUSPIRONE HYDROCHLORIDE 5 MG/1
5 TABLET ORAL 3 TIMES DAILY
Qty: 90 TABLET | Refills: 1 | Status: SHIPPED | OUTPATIENT
Start: 2023-12-28
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
## 2024-02-22 DIAGNOSIS — F41.1 GENERALIZED ANXIETY DISORDER: ICD-10-CM

## 2024-02-22 RX ORDER — BUSPIRONE HYDROCHLORIDE 5 MG/1
5 TABLET ORAL 3 TIMES DAILY
Qty: 90 TABLET | Refills: 1 | Status: SHIPPED | OUTPATIENT
Start: 2024-02-22

## 2024-03-14 ENCOUNTER — TELEMEDICINE (OUTPATIENT)
Dept: PSYCHIATRY | Facility: CLINIC | Age: 54
End: 2024-03-14
Payer: MEDICARE

## 2024-03-14 DIAGNOSIS — F41.1 GENERALIZED ANXIETY DISORDER: Primary | ICD-10-CM

## 2024-03-14 DIAGNOSIS — F33.1 MODERATE EPISODE OF RECURRENT MAJOR DEPRESSIVE DISORDER: ICD-10-CM

## 2024-03-14 RX ORDER — BUSPIRONE HYDROCHLORIDE 7.5 MG/1
7.5 TABLET ORAL 3 TIMES DAILY
Qty: 90 TABLET | Refills: 2 | Status: SHIPPED | OUTPATIENT
Start: 2024-03-14
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
## 2024-09-28 DIAGNOSIS — F41.1 GENERALIZED ANXIETY DISORDER: ICD-10-CM

## 2024-09-30 RX ORDER — BUSPIRONE HYDROCHLORIDE 7.5 MG/1
7.5 TABLET ORAL 3 TIMES DAILY
Qty: 90 TABLET | Refills: 2 | Status: SHIPPED | OUTPATIENT
Start: 2024-09-30

## 2024-12-18 ENCOUNTER — TRANSCRIBE ORDERS (OUTPATIENT)
Dept: ADMINISTRATIVE | Facility: HOSPITAL | Age: 54
End: 2024-12-18
Payer: MEDICARE

## 2024-12-18 DIAGNOSIS — Z12.31 ENCOUNTER FOR SCREENING MAMMOGRAM FOR MALIGNANT NEOPLASM OF BREAST: Primary | ICD-10-CM

## 2025-01-04 DIAGNOSIS — F41.1 GENERALIZED ANXIETY DISORDER: ICD-10-CM

## 2025-01-06 DIAGNOSIS — F41.1 GENERALIZED ANXIETY DISORDER: ICD-10-CM

## 2025-01-06 RX ORDER — BUSPIRONE HYDROCHLORIDE 7.5 MG/1
7.5 TABLET ORAL 3 TIMES DAILY
Qty: 90 TABLET | Refills: 2 | OUTPATIENT
Start: 2025-01-06

## 2025-01-06 NOTE — TELEPHONE ENCOUNTER
Refill request sent thru interface, Patient needs a follow up appointment to continue medication refills.

## 2025-01-06 NOTE — TELEPHONE ENCOUNTER
Patient unreachable has not seen provider since 03/14/2024. Refusing refill if Patient calls back will resend to provider to review.Rx Refill Note  Requested Prescriptions     Refused Prescriptions Disp Refills    busPIRone (BUSPAR) 7.5 MG tablet [Pharmacy Med Name: buspirone 7.5 mg tablet] 90 tablet 2     Sig: TAKE ONE TABLET BY MOUTH THREE TIMES DAILY     Refused By: ALFRED WAN     Reason for Refusal: Patient needs an appointment      Last office visit with prescribing clinician: 11/13/2023   Last telemedicine visit with prescribing clinician: Visit date not found   Next office visit with prescribing clinician: Visit date not found                         Would you like a call back once the refill request has been completed: [] Yes [] No    If the office needs to give you a call back, can they leave a voicemail: [] Yes [] No    Alfred Wan CMA  01/06/25, 10:38 EST

## 2025-01-14 DIAGNOSIS — F41.1 GENERALIZED ANXIETY DISORDER: ICD-10-CM

## 2025-01-14 RX ORDER — BUSPIRONE HYDROCHLORIDE 7.5 MG/1
7.5 TABLET ORAL 3 TIMES DAILY
Qty: 90 TABLET | Refills: 2 | OUTPATIENT
Start: 2025-01-14

## 2025-04-03 ENCOUNTER — HOSPITAL ENCOUNTER (OUTPATIENT)
Dept: MAMMOGRAPHY | Facility: HOSPITAL | Age: 55
Discharge: HOME | End: 2025-04-03
Admitting: FAMILY MEDICINE
Payer: MEDICARE

## 2025-04-03 DIAGNOSIS — Z12.31 ENCOUNTER FOR SCREENING MAMMOGRAM FOR MALIGNANT NEOPLASM OF BREAST: ICD-10-CM

## 2025-04-03 PROCEDURE — 77063 BREAST TOMOSYNTHESIS BI: CPT

## 2025-04-03 PROCEDURE — 77067 SCR MAMMO BI INCL CAD: CPT

## 2025-07-06 ENCOUNTER — HOSPITAL ENCOUNTER (EMERGENCY)
Facility: HOSPITAL | Age: 55
Discharge: HOME | End: 2025-07-06
Attending: EMERGENCY MEDICINE | Admitting: EMERGENCY MEDICINE
Payer: MEDICARE

## 2025-07-06 VITALS
SYSTOLIC BLOOD PRESSURE: 134 MMHG | OXYGEN SATURATION: 98 % | WEIGHT: 155 LBS | RESPIRATION RATE: 18 BRPM | DIASTOLIC BLOOD PRESSURE: 64 MMHG | BODY MASS INDEX: 26.46 KG/M2 | HEART RATE: 81 BPM | HEIGHT: 64 IN | TEMPERATURE: 98.3 F

## 2025-07-06 DIAGNOSIS — T21.21XA BURN OF SECOND DEGREE OF CHEST WALL, INITIAL ENCOUNTER: Primary | ICD-10-CM

## 2025-07-06 PROCEDURE — 99282 EMERGENCY DEPT VISIT SF MDM: CPT

## 2025-07-06 PROCEDURE — 99283 EMERGENCY DEPT VISIT LOW MDM: CPT | Performed by: EMERGENCY MEDICINE

## 2025-07-06 RX ORDER — GINSENG 100 MG
1 CAPSULE ORAL 2 TIMES DAILY
Qty: 28 G | Refills: 0 | Status: SHIPPED | OUTPATIENT
Start: 2025-07-06

## 2025-07-06 RX ORDER — BACITRACIN ZINC 500 [USP'U]/G
1 OINTMENT TOPICAL ONCE
Status: COMPLETED | OUTPATIENT
Start: 2025-07-06 | End: 2025-07-06

## 2025-07-06 RX ADMIN — BACITRACIN ZINC 1 APPLICATION: 500 OINTMENT TOPICAL at 21:39

## 2025-07-07 NOTE — DISCHARGE INSTRUCTIONS
If you notice any concerning symptoms, please return to the ER immediately. These can include but are not limited to: worsening of you condition, fevers, chills, shortness of breath, vomiting, weakness of the extremities, changes in your mental status, numbness, pale extremities, or chest pain.     Take medications as prescribed, your pharmacist may have additional recommendations concerning these medications.    For pain use ibuprofen (Motrin) or acetaminophen (Tylenol), unless prescribed medications that also contain these medications.  You can take over the counter acetaminophen or ibuprofen, please follow the directions as dosages differ. Do not take ibuprofen if you have a history of peptic ulcers, kidney disease, bariatric surgery, or are currently pregnant.  Do not take Tylenol if you have a history of liver disease or alcohol use disorder.        THANK YOU!!! From [facility] Emergency Department    On behalf of the Emergency Department staff at [facility], I would like to thank you for giving us the opportunity to address your health care needs and concerns. We hope that during your visit, our service was delivered in a professional and caring manner. Please keep [facility] in mind as we walk with you down the path to your own personal wellness. Please expect follow-up phone calls concerning additional care and questions about your experience.      You have received additional information specific to your diagnosis in these discharge instructions, please read these fully.  Anytime you have been seen in the emergency department we recommend close follow up with your primary care provider or specialist, please follow these directions as indicated.      Please follow-up closely with your primary care doctor, if you have any worsening pain, worsening burn area over the nipple or areola, worsening redness that demonstrates signs of infection or concerns for infection please return to  the emergency department.    Please use the bacitracin regularly and keep the site clean.

## (undated) DEVICE — FRCP BIOP COLD ENDOJAW ALLGTR W/NDL 2.8X2300MM BLU

## (undated) DEVICE — VLV SXN AIR/H2O ORCAPOD3 1P/U STRL

## (undated) DEVICE — GLV SURG SENSICARE W/ALOE PF LF 8.5 STRL

## (undated) DEVICE — SUCTION CANISTER, 1500CC, RIGID: Brand: DEROYAL

## (undated) DEVICE — Device

## (undated) DEVICE — KT ORCA VLV SXN AIR/H2O W/SEAL 1P/U STRL

## (undated) DEVICE — CONMED SCOPE SAVER BITE BLOCK, 20X27 MM: Brand: SCOPE SAVER

## (undated) DEVICE — LUBE JELLY PK/2.75GM STRL BX/144

## (undated) DEVICE — ENDOGATOR AUXILIARY WATER JET CONNECTOR: Brand: ENDOGATOR

## (undated) DEVICE — PAD GRND REM POLYHESIVE A/ DISP

## (undated) DEVICE — HYBRID TUBING/CAP SET FOR OLYMPUS® SCOPES: Brand: ERBE

## (undated) DEVICE — JELLY,LUBE,STERILE,FLIP TOP,TUBE,2-OZ: Brand: MEDLINE

## (undated) DEVICE — ENDOSCOPY PORT CONNECTOR FOR OLYMPUS® SCOPES: Brand: ERBE